# Patient Record
Sex: MALE | Race: BLACK OR AFRICAN AMERICAN | Employment: OTHER | URBAN - METROPOLITAN AREA
[De-identification: names, ages, dates, MRNs, and addresses within clinical notes are randomized per-mention and may not be internally consistent; named-entity substitution may affect disease eponyms.]

---

## 2017-08-11 ENCOUNTER — HOSPITAL ENCOUNTER (EMERGENCY)
Age: 67
Discharge: HOME | End: 2017-08-11
Attending: EMERGENCY MEDICINE
Payer: COMMERCIAL

## 2017-08-11 VITALS
WEIGHT: 280.43 LBS | DIASTOLIC BLOOD PRESSURE: 97 MMHG | TEMPERATURE: 98.6 F | RESPIRATION RATE: 18 BRPM | OXYGEN SATURATION: 99 % | SYSTOLIC BLOOD PRESSURE: 172 MMHG | BODY MASS INDEX: 38.03 KG/M2

## 2017-08-11 DIAGNOSIS — T78.3XXA ANGIOEDEMA, INITIAL ENCOUNTER: Primary | ICD-10-CM

## 2017-08-11 DIAGNOSIS — H60.502 ACUTE OTITIS EXTERNA OF LEFT EAR, UNSPECIFIED TYPE: ICD-10-CM

## 2017-08-11 PROCEDURE — 96374 THER/PROPH/DIAG INJ IV PUSH: CPT

## 2017-08-11 PROCEDURE — 96375 TX/PRO/DX INJ NEW DRUG ADDON: CPT

## 2017-08-11 PROCEDURE — 74011000250 HC RX REV CODE- 250: Performed by: EMERGENCY MEDICINE

## 2017-08-11 PROCEDURE — 99284 EMERGENCY DEPT VISIT MOD MDM: CPT

## 2017-08-11 PROCEDURE — 74011250636 HC RX REV CODE- 250/636: Performed by: EMERGENCY MEDICINE

## 2017-08-11 RX ORDER — CIPROFLOXACIN AND DEXAMETHASONE 3; 1 MG/ML; MG/ML
4 SUSPENSION/ DROPS AURICULAR (OTIC) 2 TIMES DAILY
Qty: 7.5 ML | Refills: 0 | Status: SHIPPED | OUTPATIENT
Start: 2017-08-11 | End: 2017-08-18

## 2017-08-11 RX ORDER — HYDROCHLOROTHIAZIDE 12.5 MG/1
12.5 TABLET ORAL DAILY
Qty: 30 TAB | Refills: 0 | Status: SHIPPED | OUTPATIENT
Start: 2017-08-11 | End: 2017-09-10

## 2017-08-11 RX ORDER — DIPHENHYDRAMINE HYDROCHLORIDE 50 MG/ML
25 INJECTION, SOLUTION INTRAMUSCULAR; INTRAVENOUS
Status: COMPLETED | OUTPATIENT
Start: 2017-08-11 | End: 2017-08-11

## 2017-08-11 RX ORDER — DEXAMETHASONE SODIUM PHOSPHATE 4 MG/ML
10 INJECTION, SOLUTION INTRA-ARTICULAR; INTRALESIONAL; INTRAMUSCULAR; INTRAVENOUS; SOFT TISSUE
Status: COMPLETED | OUTPATIENT
Start: 2017-08-11 | End: 2017-08-11

## 2017-08-11 RX ADMIN — FAMOTIDINE 20 MG: 10 INJECTION, SOLUTION INTRAVENOUS at 09:49

## 2017-08-11 RX ADMIN — DIPHENHYDRAMINE HYDROCHLORIDE 25 MG: 50 INJECTION, SOLUTION INTRAMUSCULAR; INTRAVENOUS at 09:50

## 2017-08-11 RX ADMIN — DEXAMETHASONE SODIUM PHOSPHATE 10 MG: 4 INJECTION, SOLUTION INTRAMUSCULAR; INTRAVENOUS at 09:50

## 2017-08-11 NOTE — ED NOTES
ermd in to see, piv established, pt here today for angioedema to the lips especially on the right side.

## 2017-08-11 NOTE — ED PROVIDER NOTES
HPI Comments: 62 yo M with Hx of HTN on Hyzaar for 2-3 years presenting for evaluation of facial swelling x waking at ~0600. Pt reports swelling was initially present on the left lip and face, then subsided and began to have swelling on the right. Wife notes mildly slurred speech, but no hoarseness. Pt also notes tongue swelling without any difficulty breathing or trouble swallowing. Pt took benadryl PTA without significant change. He denies any Hx of similar symptoms. Pt also notes left ear pain and discharge for the past few days. Denies F/C or headache. The history is provided by the patient. Past Medical History:   Diagnosis Date    Arrhythmia     Arthritis     HTN (hypertension)     Hypercholesterolemia     Low serum testosterone level     Sleep apnea        Past Surgical History:   Procedure Laterality Date    HX APPENDECTOMY      HX GI      COLONOSCOPY    HX HEENT      LASIK    HX HERNIA REPAIR      [facility]    HX ORTHOPAEDIC  2012    [place]patient, [place].(Dr. Javier Sevilla) ROTATOR CUFF    HX TONSILLECTOMY           Family History:   Problem Relation Age of Onset    Heart Disease Mother     Hypertension Mother     Heart Disease Father       \"Heart failure\" age 76    Hypertension Father     Bleeding Prob Sister      AFTER CHILDBIRTH-BLEEDING    Liver Disease Brother       cirrhosis (ETOH)    Alcohol abuse Brother     Heart Disease Sister      HEART FAILURE    Arthritis-osteo Sister        Social History     Social History    Marital status:      Spouse name: N/A    Number of children: N/A    Years of education: N/A     Occupational History    Not on file.      Social History Main Topics    Smoking status: Light Tobacco Smoker    Smokeless tobacco: Never Used      Comment: CIGARS    Alcohol use Yes      Comment: Rare ETOH    Drug use: Not on file    Sexual activity: Not on file     Other Topics Concern    Not on file     Social History Narrative ALLERGIES: Ace inhibitors and Shrimp    Review of Systems   Constitutional: Negative for chills, diaphoresis and fever. HENT: Positive for ear discharge, ear pain and facial swelling. Negative for congestion, rhinorrhea, sore throat and trouble swallowing. Eyes: Negative for photophobia, redness and visual disturbance. Respiratory: Negative for apnea, cough, shortness of breath, wheezing and stridor. Cardiovascular: Negative for chest pain, palpitations and leg swelling. Gastrointestinal: Negative for abdominal pain, diarrhea, nausea and vomiting. Endocrine: Negative for polyuria. Genitourinary: Negative for dysuria, frequency and hematuria. Musculoskeletal: Negative for arthralgias, back pain and myalgias. Skin: Negative for color change, pallor, rash and wound. Neurological: Negative for seizures, syncope and headaches. Psychiatric/Behavioral: Negative for agitation and confusion. The patient is not nervous/anxious. All other systems reviewed and are negative. Vitals:    08/11/17 0928 08/11/17 0929 08/11/17 0930 08/11/17 1000   BP: (!) 188/105  (!) 188/105 (!) 176/102   Resp:   18    Temp:   98.6 °F (37 °C)    SpO2:  100% 100% 100%   Weight:   127.2 kg (280 lb 6.8 oz)             Physical Exam   Constitutional: He appears well-developed and well-nourished. No distress. Obese middle-aged male   HENT:   Head: Normocephalic and atraumatic. Right upper and lower lip edema, tongue edema, no obvious tonsillar or uvula edema, handling secretions    TMs gray, without bulging or erythema. Left TM incompletely visualized due to exudate    Eyes: Conjunctivae are normal. No scleral icterus. Neck: Neck supple. No tracheal deviation present. Cardiovascular: Normal rate, regular rhythm and normal heart sounds. Exam reveals no gallop and no friction rub. No murmur heard. Pulmonary/Chest: Effort normal and breath sounds normal. No stridor. No respiratory distress.  He has no wheezes. He has no rales. Abdominal: Soft. He exhibits no distension and no mass. There is no tenderness. There is no rebound and no guarding. Musculoskeletal: He exhibits no edema or deformity. Neurological: He is alert. Skin: Skin is warm and dry. No rash noted. He is not diaphoretic. No erythema. No pallor. Psychiatric: He has a normal mood and affect. His behavior is normal.   Nursing note and vitals reviewed. MDM  Number of Diagnoses or Management Options  Diagnosis management comments: DDx: Angioedema, otitis externa    A/P: 60 yo M with Hx of HTN on ARB-HCTZ combination presenting with angioedema x ~0600. Will give IV pepcid, benadryl and steroids and monitor for 2 hours. Pt also with apparent otitis externa, will Rx ciprodex. ED Course       Procedures    10:37 AM  Pt re-evaluated and is resting comfortably. No interval change in swelling or new difficulty breathing. Deneen Dunn MD    DISCHARGE NOTE  11:52 AM  The patient has been re-evaluated and is ready for discharge. Still with no interval change in swelling. Advised of need to stop Hyzaar and will Rx HCTZ portion. Also advised to continue OTC benadryl and H2 blocker for the next 2-3 days and close PCP F/U. for management of BP Reviewed available results with patient. Counseled pt on diagnosis and care plan. Pt has expressed understanding, and all questions have been answered. Pt agrees with plan and agrees to F/U as recommended, or return to the ED if their sxs worsen. Discharge instructions have been provided and explained to the pt, along with reasons to return to the ED. Deneen Dunn MD    LABORATORY TESTS:  No results found for this or any previous visit (from the past 12 hour(s)).     IMAGING RESULTS:  No orders to display       MEDICATIONS GIVEN:  Medications   famotidine (PF) (PEPCID) 20 mg in sodium chloride 0.9 % 10 mL injection (20 mg IntraVENous Given 8/11/17 0949)   dexamethasone (DECADRON) 4 mg/mL injection 10 mg (10 mg IntraVENous Given 1750)   diphenhydrAMINE (BENADRYL) injection 25 mg (25 mg IntraVENous Given 17)       IMPRESSION:  1. Angioedema, initial encounter    2. Acute otitis externa of left ear, unspecified type        PLAN:  1. Discharge Medication List as of 2017 11:59 AM      START taking these medications    Details   hydroCHLOROthiazide (HYDRODIURIL) 12.5 mg tablet Take 1 Tab by mouth daily for 30 days. , Normal, Disp-30 Tab, R-0      ciprofloxacin-dexamethasone (CIPRODEX) 0.3-0.1 % otic suspension Administer 4 Drops in left ear two (2) times a day for 7 days. , Normal, Disp-7.5 mL, R-0         CONTINUE these medications which have NOT CHANGED    Details   CIALIS 20 mg tablet TAKE 1/2 (ONE HALF) TABLET BY MOUTH EVERY DAY AS NEEDED FOR ERECTILE DYSFUNCTION, Normal, Disp-15 Tab, R-4      metoprolol (LOPRESSOR) 25 mg tablet Take  by mouth two (2) times a day., Historical Med      aspirin delayed-release 81 mg tablet Take  by mouth daily. , Historical Med         STOP taking these medications       losartan-hydrochlorothiazide (HYZAAR) 50-12.5 mg per tablet Comments:   Reason for Stoppin.   Follow-up Information     Follow up With Details Comments 64 Milagros Torres DO In 2 days  [facility] [address] [place] [address]  [phone]      [facility]  As needed, If symptoms worsen [address] [place] [address]  [phone]        Return to ED if worse

## 2017-08-11 NOTE — DISCHARGE INSTRUCTIONS
Angioedema: Care Instructions  Your Care Instructions  Angioedema is an allergic reaction. It causes swelling and welts in the deep layers of the skin. Angioedema can sometimes occur along with hives. Hives are an allergic reaction in the outer layers of the skin. Angioedema can range from mild to severe. Painful welts can develop on the face. Angioedema can also occur on other parts of the body. In severe cases, the inside of the throat can swell and make it hard to breathe. Many things can cause this condition, including foods, insect bites, and medicines (such as aspirin and some blood pressure medicines). It also can run in families. Sometimes you may know what caused the reaction, but other times you may not know. Follow-up care is a key part of your treatment and safety. Be sure to make and go to all appointments, and call your doctor if you are having problems. It's also a good idea to know your test results and keep a list of the medicines you take. How can you care for yourself at home? · Take your medicines exactly as prescribed. Call your doctor if you think you are having a problem with your medicine. You will get more details on the specific medicines your doctor prescribes. Some medicines used to treat angioedema can make you too sleepy to drive safely. Do not drive if you take medicine that may make you sleepy. · Avoid foods or medicine that may have triggered the swelling. · For comfort:  ¨ Try taking a cool bath. Or place a cool, wet towel on the swollen area. ¨ Avoid hot baths and showers. ¨ Wear loose clothing. · Your doctor may prescribe a shot of epinephrine to carry with you in case you have a severe reaction. Learn how to give yourself the shot and keep it with you at all times. Make sure it has not . When should you call for help? Give an epinephrine shot if:  · You think you are having a severe allergic reaction.   After giving an epinephrine shot call 911, even if you feel better. Call 911 if:  · You have symptoms of a severe allergic reaction. These may include:  ¨ Sudden raised, red areas (hives) all over your body. ¨ Swelling of the throat, mouth, lips, or tongue. ¨ Trouble breathing. ¨ Passing out (losing consciousness). Or you may feel very lightheaded or suddenly feel weak, confused, or restless. · You have been given an epinephrine shot, even if you feel better. Call your doctor now or seek immediate medical care if:  · You have symptoms of an allergic reaction, such as:  ¨ A rash or hives (raised, red areas on the skin). ¨ Itching. ¨ Swelling. ¨ Belly pain, nausea, or vomiting. Watch closely for changes in your health, and be sure to contact your doctor if:  · You do not get better as expected. Where can you learn more? Go to http://sarmadEasy Tempoalessandra.info/. Enter Z894 in the search box to learn more about \"Angioedema: Care Instructions. \"  Current as of: April 3, 2017  Content Version: 11.3  © 2570-6610 [employer]. Care instructions adapted under license by [employer] (which disclaims liability or warranty for this information). If you have questions about a medical condition or this instruction, always ask your healthcare professional. [address] any warranty or liability for your use of this information. Swimmer's Ear: Care Instructions  Your Care Instructions    Swimmer's ear (otitis externa) is inflammation or infection of the ear canal. This is the passage that leads from the outer ear to the eardrum. Any water, sand, or other debris that gets into the ear canal and stays there can cause swimmer's ear. Putting cotton swabs or other items in the ear to clean it can also cause this problem. Swimmer's ear can be very painful. But you can treat the pain and infection with medicines. You should feel better in a few days. Follow-up care is a key part of your treatment and safety.  Be sure to make and go to all appointments, and call your doctor if you are having problems. It's also a good idea to know your test results and keep a list of the medicines you take. How can you care for yourself at home? Cleaning and care  · Use antibiotic drops as your doctor directs. · Do not insert ear drops (other than the antibiotic ear drops) or anything else into the ear unless your doctor has told you to. · Avoid getting water in the ear until the problem clears up. Use cotton lightly coated with petroleum jelly as an earplug. Do not use plastic earplugs. · Use a hair dryer set on low to carefully dry the ear after you shower. · To ease ear pain, hold a warm washcloth against your ear. · Take pain medicines exactly as directed. ¨ If the doctor gave you a prescription medicine for pain, take it as prescribed. ¨ If you are not taking a prescription pain medicine, ask your doctor if you can take an over-the-counter medicine. Inserting ear drops  · Warm the drops to body temperature by rolling the container in your hands. Or you can place it in a cup of warm water for a few minutes. · Lie down, with your ear facing up. · Place drops inside the ear. Follow your doctor's instructions (or the directions on the label) for how many drops to use. Gently wiggle the outer ear or pull the ear up and back to help the drops get into the ear. · It's important to keep the liquid in the ear canal for 3 to 5 minutes. When should you call for help? Call your doctor now or seek immediate medical care if:  · You have a new or higher fever. · You have new or worse pain, swelling, warmth, or redness around or behind your ear. · You have new or increasing pus or blood draining from your ear. Watch closely for changes in your health, and be sure to contact your doctor if:  · You are not getting better after 2 days (48 hours). Where can you learn more? Go to http://sarmad-alessandra.info/.   Enter P088 in the search box to learn more about \"Swimmer's Ear: Care Instructions. \"  Current as of: July 29, 2016  Content Version: 11.3  © 1897-9969 [employer]. Care instructions adapted under license by [employer] (which disclaims liability or warranty for this information). If you have questions about a medical condition or this instruction, always ask your healthcare professional. [place] [address] any warranty or liability for your use of this information.

## 2018-02-07 RX ORDER — LOSARTAN POTASSIUM 100 MG/1
100 TABLET ORAL DAILY
COMMUNITY
End: 2019-01-16

## 2018-02-07 RX ORDER — HYDROCHLOROTHIAZIDE 25 MG/1
25 TABLET ORAL DAILY
COMMUNITY

## 2018-02-07 RX ORDER — SILDENAFIL CITRATE 20 MG/1
20 TABLET ORAL AS NEEDED
COMMUNITY
End: 2019-07-10

## 2018-02-07 RX ORDER — ROSUVASTATIN CALCIUM 10 MG/1
10 TABLET, COATED ORAL
Status: ON HOLD | COMMUNITY
End: 2018-02-09

## 2018-02-09 ENCOUNTER — ANESTHESIA (OUTPATIENT)
Dept: ENDOSCOPY | Age: 68
End: 2018-02-09
Payer: MEDICARE

## 2018-02-09 ENCOUNTER — ANESTHESIA EVENT (OUTPATIENT)
Dept: ENDOSCOPY | Age: 68
End: 2018-02-09
Payer: MEDICARE

## 2018-02-09 ENCOUNTER — HOSPITAL ENCOUNTER (OUTPATIENT)
Age: 68
Setting detail: OUTPATIENT SURGERY
Discharge: HOME | End: 2018-02-09
Attending: INTERNAL MEDICINE | Admitting: INTERNAL MEDICINE
Payer: MEDICARE

## 2018-02-09 VITALS
SYSTOLIC BLOOD PRESSURE: 152 MMHG | RESPIRATION RATE: 18 BRPM | HEART RATE: 57 BPM | WEIGHT: 273 LBS | DIASTOLIC BLOOD PRESSURE: 69 MMHG | OXYGEN SATURATION: 100 % | HEIGHT: 71 IN | TEMPERATURE: 98.1 F | BODY MASS INDEX: 38.22 KG/M2

## 2018-02-09 PROCEDURE — 76060000031 HC ANESTHESIA FIRST 0.5 HR: Performed by: INTERNAL MEDICINE

## 2018-02-09 PROCEDURE — 74011250636 HC RX REV CODE- 250/636: Performed by: INTERNAL MEDICINE

## 2018-02-09 PROCEDURE — 74011000250 HC RX REV CODE- 250

## 2018-02-09 PROCEDURE — 74011250636 HC RX REV CODE- 250/636

## 2018-02-09 PROCEDURE — 76040000019: Performed by: INTERNAL MEDICINE

## 2018-02-09 PROCEDURE — 74011250637 HC RX REV CODE- 250/637: Performed by: ANESTHESIOLOGY

## 2018-02-09 RX ORDER — SODIUM CHLORIDE 0.9 % (FLUSH) 0.9 %
5-10 SYRINGE (ML) INJECTION EVERY 8 HOURS
Status: DISCONTINUED | OUTPATIENT
Start: 2018-02-09 | End: 2018-02-09 | Stop reason: HOSPADM

## 2018-02-09 RX ORDER — LIDOCAINE HYDROCHLORIDE 20 MG/ML
INJECTION, SOLUTION EPIDURAL; INFILTRATION; INTRACAUDAL; PERINEURAL AS NEEDED
Status: DISCONTINUED | OUTPATIENT
Start: 2018-02-09 | End: 2018-02-09 | Stop reason: HOSPADM

## 2018-02-09 RX ORDER — DEXTROMETHORPHAN/PSEUDOEPHED 2.5-7.5/.8
1.2 DROPS ORAL
Status: DISCONTINUED | OUTPATIENT
Start: 2018-02-09 | End: 2018-02-09 | Stop reason: HOSPADM

## 2018-02-09 RX ORDER — EPINEPHRINE 0.1 MG/ML
1 INJECTION INTRACARDIAC; INTRAVENOUS
Status: DISCONTINUED | OUTPATIENT
Start: 2018-02-09 | End: 2018-02-09 | Stop reason: HOSPADM

## 2018-02-09 RX ORDER — SODIUM CHLORIDE 9 MG/ML
75 INJECTION, SOLUTION INTRAVENOUS CONTINUOUS
Status: DISCONTINUED | OUTPATIENT
Start: 2018-02-09 | End: 2018-02-09 | Stop reason: HOSPADM

## 2018-02-09 RX ORDER — SODIUM CHLORIDE 0.9 % (FLUSH) 0.9 %
5-10 SYRINGE (ML) INJECTION AS NEEDED
Status: DISCONTINUED | OUTPATIENT
Start: 2018-02-09 | End: 2018-02-09 | Stop reason: HOSPADM

## 2018-02-09 RX ORDER — FLUMAZENIL 0.1 MG/ML
0.2 INJECTION INTRAVENOUS
Status: DISCONTINUED | OUTPATIENT
Start: 2018-02-09 | End: 2018-02-09 | Stop reason: HOSPADM

## 2018-02-09 RX ORDER — ATROPINE SULFATE 0.1 MG/ML
0.5 INJECTION INTRAVENOUS
Status: DISCONTINUED | OUTPATIENT
Start: 2018-02-09 | End: 2018-02-09 | Stop reason: HOSPADM

## 2018-02-09 RX ORDER — NALOXONE HYDROCHLORIDE 0.4 MG/ML
0.4 INJECTION, SOLUTION INTRAMUSCULAR; INTRAVENOUS; SUBCUTANEOUS
Status: DISCONTINUED | OUTPATIENT
Start: 2018-02-09 | End: 2018-02-09 | Stop reason: HOSPADM

## 2018-02-09 RX ORDER — PROPOFOL 10 MG/ML
INJECTION, EMULSION INTRAVENOUS AS NEEDED
Status: DISCONTINUED | OUTPATIENT
Start: 2018-02-09 | End: 2018-02-09 | Stop reason: HOSPADM

## 2018-02-09 RX ADMIN — PROPOFOL 150 MG: 10 INJECTION, EMULSION INTRAVENOUS at 08:36

## 2018-02-09 RX ADMIN — PROPOFOL 70 MG: 10 INJECTION, EMULSION INTRAVENOUS at 08:27

## 2018-02-09 RX ADMIN — SODIUM CHLORIDE: 900 INJECTION, SOLUTION INTRAVENOUS at 08:17

## 2018-02-09 RX ADMIN — SIMETHICONE 80 MG: 20 SUSPENSION/ DROPS ORAL at 08:33

## 2018-02-09 RX ADMIN — LIDOCAINE HYDROCHLORIDE 50 MG: 20 INJECTION, SOLUTION EPIDURAL; INFILTRATION; INTRACAUDAL; PERINEURAL at 08:27

## 2018-02-09 NOTE — PROCEDURES
NAME:  General Pompey   :   1950   MRN:   [id number]     Date/Time:  2018 8:38 AM    Colonoscopy Operative Report    Procedure Type:   Colonoscopy --screening     Indications:     Screening colonoscopy  Pre-operative Diagnosis: see indication above  Post-operative Diagnosis:  See findings below  :  Pastor Ashly MD  Referring Provider: --Kendra Espinoza,     Exam:  Airway: clear, no airway problems anticipated  Heart: RRR, without gallops or rubs  Lungs: clear bilaterally without wheezes, crackles, or rhonchi  Abdomen: soft, nontender, nondistended, bowel sounds present  Mental Status: awake, alert and oriented to person, place and time    Sedation:  MAC anesthesia Propofol  Procedure Details:  After informed consent was obtained with all risks and benefits of procedure explained and preoperative exam completed, the patient was taken to the endoscopy suite and placed in the left lateral decubitus position. Upon sequential sedation as per above, a digital rectal exam was performed demonstrating internal hemorrhoids. The Olympus videocolonoscope  was inserted in the rectum and carefully advanced to the cecum, which was identified by the ileocecal valve and appendiceal orifice. The quality of preparation was good. The colonoscope was slowly withdrawn with careful evaluation between folds. Retroflexion in the rectum was completed demonstrating internal hemorrhoids. Findings:   1. Normal colonoscopy through to the cecum  2. Small internal hemorrhoids seen on retroflexion. Specimen Removed:  None  Complications: None. EBL:  None. Impression:    1. Normal colonoscopy through to the cecum  2. Small internal hemorrhoids seen on retroflexion. Recommendations:   1. Repeat colonoscopy in 10 years for screening purposes. Discharge Disposition:  Home in the company of a  when able to ambulate.       James Venegas MD

## 2018-02-09 NOTE — ANESTHESIA POSTPROCEDURE EVALUATION
Post-Anesthesia Evaluation and Assessment    Patient: Benigno Palomares MRN: [id number]  SSN: [id number]    YOB: 1950  Age: 74 y.o. Sex: male       Cardiovascular Function/Vital Signs  Visit Vitals    /66    Pulse 60    Temp 36.8 °C (98.2 °F)    Resp 14    Ht 5' 11\" (1.803 m)    Wt 123.8 kg (273 lb)    SpO2 98%    BMI 38.08 kg/m2       Patient is status post general, total IV anesthesia anesthesia for Procedure(s):  COLONOSCOPY. Nausea/Vomiting: None    Postoperative hydration reviewed and adequate. Pain:  Pain Scale 1: Numeric (0 - 10) (02/09/18 0803)  Pain Intensity 1: 0 (02/09/18 0803)   Managed    Neurological Status: At baseline    Mental Status and Level of Consciousness: Arousable    Pulmonary Status:   O2 Device: CO2 nasal cannula (02/09/18 0839)   Adequate oxygenation and airway patent    Complications related to anesthesia: None    Post-anesthesia assessment completed.  No concerns    Signed By: Gemini Lara MD     February 9, 2018

## 2018-02-09 NOTE — PERIOP NOTES
Endoscope was pre-cleaned at the bedside immediately following procedure by [facility].

## 2018-02-09 NOTE — IP AVS SNAPSHOT
[address] 
[phone] Patient: Angela Causey MRN: [id number] TULIO:[id number] About your hospitalization You were admitted on:  February 9, 2018 You last received care in the:  [facility] ENDOSCOPY You were discharged on:  February 9, 2018 Why you were hospitalized Your primary diagnosis was:  Not on File Follow-up Information Follow up With Details Comments Contact Info Natasha Gonzalesn, [address] [facility] [address] [place] [address] 
[phone] Discharge Orders None A check mami indicates which time of day the medication should be taken. My Medications CONTINUE taking these medications Instructions Each Dose to Equal  
 Morning Noon Evening Bedtime  
 aspirin delayed-release 81 mg tablet Your last dose was: Your next dose is: Take  by mouth daily. hydroCHLOROthiazide 25 mg tablet Commonly known as:  HYDRODIURIL Your last dose was: Your next dose is: Take 25 mg by mouth daily. 25 mg  
    
   
   
   
  
 losartan 100 mg tablet Commonly known as:  COZAAR Your last dose was: Your next dose is: Take 100 mg by mouth daily. 100 mg  
    
   
   
   
  
 metoprolol tartrate 25 mg tablet Commonly known as:  LOPRESSOR Your last dose was: Your next dose is: Take  by mouth two (2) times a day. sildenafil (antihypertensive) 20 mg tablet Commonly known as:  REVATIO Your last dose was: Your next dose is: Take 20 mg by mouth two (2) times a day. 20 mg Discharge Instructions General Pompey [id number] 
1950 COLON DISCHARGE INSTRUCTIONS Discomfort: 
Redness at IV site- apply warm compress to area; if redness or soreness persist- contact your physician There may be a slight amount of blood passed from the rectum Gaseous discomfort- walking, belching will help relieve any discomfort You may not operate a vehicle for 12 hours You may not engage in an occupation involving machinery or appliances for rest of today You may not drink alcoholic beverages for at least 12 hours Avoid making any critical decisions for at least 24 hour DIET: 
 Regular diet.  however -  remember your colon is empty and a heavy meal will produce gas. Avoid these foods:  vegetables, fried / greasy foods, carbonated drinks for today MEDICATION: 
Per Medication reconciliation ACTIVITY: 
You may not resume your normal daily activities until tomorrow AM; it is recommended that you spend the remainder of the day resting -  avoid any strenuous activity. CALL M.D. ANY SIGN OF: Increasing pain, nausea, vomiting Abdominal distension (swelling) New increased bleeding (oral or rectal) Fever (chills) IMPRESSION: 
Impression: 1. Normal colonoscopy through to the cecum 2. Small internal hemorrhoids seen on retroflexion. Recommendations: 1. Repeat colonoscopy in 10 years for screening purposes. Follow-up Instructions: 
Telephone # [phone] Lois Shone, MD 
Ac[employer]ure Activation Thank you for requesting access to Acrisure. Please follow the instructions below to securely access and download your online medical record. Acrisure allows you to send messages to your doctor, view your test results, renew your prescriptions, schedule appointments, and more. How Do I Sign Up? 1. In your internet browser, go to [email or web address] 
2. Click on the First Time User? Click Here link in the Sign In box. You will be redirect to the New Member Sign Up page. 3. Enter your Acrisure Access Code exactly as it appears below. You will not need to use this code after youve completed the sign-up process.  If you do not sign up before the expiration date, you must request a new code. Stockpile Access Code: [email or web address] Expires: 5/10/2018  7:09 AM (This is the date your Stockpile access code will ) 4. Enter the last four digits of your Social Security Number (xxxx) and Date of Birth (mm/dd/yyyy) as indicated and click Submit. You will be taken to the next sign-up page. 5. Create a Stockpile ID. This will be your Stockpile login ID and cannot be changed, so think of one that is secure and easy to remember. 6. Create a Stockpile password. You can change your password at any time. 7. Enter your Password Reset Question and Answer. This can be used at a later time if you forget your password. 8. Enter your e-mail address. You will receive e-mail notification when new information is available in [address] 9. Click Sign Up. You can now view and download portions of your medical record. 10. Click the Download Summary menu link to download a portable copy of your medical information. Additional Information If you have questions, please visit the Frequently Asked Questions section of the Stockpile website at [email or web address] Remember, Stockpile is NOT to be used for urgent needs. For medical emergencies, dial 911. Introducing [facility]! Melissa Carr introduces Stockpile patient portal. Now you can access parts of your medical record, email your doctor's office, and request medication refills online. 1. In your internet browser, go to [email or web address] 2. Click on the First Time User? Click Here link in the Sign In box. You will see the New Member Sign Up page. 3. Enter your Stockpile Access Code exactly as it appears below. You will not need to use this code after youve completed the sign-up process. If you do not sign up before the expiration date, you must request a new code. · Stockpile Access Code: [email or web address] Expires: 5/10/2018  7:09 AM 
 
4. Enter the last four digits of your Social Security Number (xxxx) and Date of Birth (mm/dd/yyyy) as indicated and click Submit. You will be taken to the next sign-up page. 5. Create a HealthPockett ID. This will be your [employer] login ID and cannot be changed, so think of one that is secure and easy to remember. 6. Create a [employer] password. You can change your password at any time. 7. Enter your Password Reset Question and Answer. This can be used at a later time if you forget your password. 8. Enter your e-mail address. You will receive e-mail notification when new information is available in [address] 9. Click Sign Up. You can now view and download portions of your medical record. 10. Click the Download Summary menu link to download a portable copy of your medical information. If you have questions, please visit the Frequently Asked Questions section of the [employer] website. Remember, [employer] is NOT to be used for urgent needs. For medical emergencies, dial 911. Now available from your iPhone and Android! Providers Seen During Your Hospitalization Provider Specialty Primary office phone Alicia Isabel MD Gastroenterology [phone] Your Primary Care Physician (PCP) Primary Care Physician Office Phone Office Fax Evelin Gonzales [phone] You are allergic to the following Allergen Reactions Ace Inhibitors Angioedema Lip swelling with losartan Shrimp Hives Recent Documentation Height Weight BMI Smoking Status 1.803 m 123.8 kg 38.08 kg/m2 Former Smoker Emergency Contacts Name Discharge Info Relation Home Work Mobile PompeyJenni DISCHARGE CAREGIVER [3] Spouse [3]   [phone] Patient Belongings The following personal items are in your possession at time of discharge: 
  Dental Appliances: None  Visual Aid: None Please provide this summary of care documentation to your next provider. Signatures-by signing, you are acknowledging that this After Visit Summary has been reviewed with you and you have received a copy. Patient Signature:  ____________________________________________________________ Date:  ____________________________________________________________  
  
Amanda Keas Provider Signature:  ____________________________________________________________ Date:  ____________________________________________________________

## 2018-02-09 NOTE — H&P
Gastroenterology Outpatient History and Physical    Patient: General Pompey    Physician: Shirley Guo MD    Chief Complaint: CRC screening  History of Present Illness: No GI complaints    History:  Past Medical History:   Diagnosis Date    Arrhythmia     enlarged heart    Arthritis     HTN (hypertension)     Hypercholesterolemia     Low serum testosterone level     Sleep apnea     wears CPAP at night      Past Surgical History:   Procedure Laterality Date    HX APPENDECTOMY      HX GI      COLONOSCOPY    HX HEENT      LASIK    HX HERNIA REPAIR  1969    [facility]    HX ORTHOPAEDIC Right 2012    [place], [place].(Dr. Alvino Lewis) ROTATOR CUFF    HX TONSILLECTOMY        Social History     Social History    Marital status:      Spouse name: N/A    Number of children: N/A    Years of education: N/A     Social History Main Topics    Smoking status: Former Smoker     Packs/day: 0.50     Quit date:     Smokeless tobacco: Never Used      Comment: CIGARS    Alcohol use Yes      Comment: Rare ETOH    Drug use: No    Sexual activity: Not Asked     Other Topics Concern    None     Social History Narrative      Family History   Problem Relation Age of Onset    Heart Disease Mother     Hypertension Mother     Heart Disease Father       \"Heart failure\" age 76    Hypertension Father     Bleeding Prob Sister      AFTER CHILDBIRTH-BLEEDING    Liver Disease Brother       cirrhosis (ETOH)    Alcohol abuse Brother     Heart Disease Sister      HEART FAILURE    Arthritis-osteo Sister       Patient Active Problem List   Diagnosis Code    HTN (hypertension) I10    Hypercholesterolemia E78.00    Encounter for long-term (current) use of other medications Z79.899    Low serum testosterone level E29.1       Allergies:    Allergies   Allergen Reactions    Ace Inhibitors Angioedema     Lip swelling with losartan    Shrimp Hives     Medications:   Prior to Admission medications    Medication Sig Start Date End Date Taking? Authorizing Provider   sildenafil, antihypertensive, (REVATIO) 20 mg tablet Take 20 mg by mouth two (2) times a day. Yes Historical Provider   losartan (COZAAR) 100 mg tablet Take 100 mg by mouth daily. Yes Historical Provider   hydroCHLOROthiazide (HYDRODIURIL) 25 mg tablet Take 25 mg by mouth daily. Yes Historical Provider   metoprolol (LOPRESSOR) 25 mg tablet Take  by mouth two (2) times a day. Yes Historical Provider   aspirin delayed-release 81 mg tablet Take  by mouth daily. Yes Historical Provider     Physical Exam:   Vital Signs: Blood pressure 144/82, pulse (!) 57, temperature 98 °F (36.7 °C), resp. rate 19, height 5' 11\" (1.803 m), weight 123.8 kg (273 lb), SpO2 99 %.   General: well developed, well nourished   HEENT: unremarkable   Heart: regular rhythm no mumur    Lungs: clear   Abdominal:  benign   Neurological: unremarkable   Extremities: no edema     Findings/Diagnosis: CRC screening  Plan of Care/Planned Procedure: Colonoscopy with conscious/deep sedation    Signed:  Meghan Arevalo MD 2/9/2018

## 2018-02-09 NOTE — PERIOP NOTES
Anesthesia reports 220mg Propofol, 50mg Lidocaine and 300mL NS given during procedure. Received report from anesthesia staff on vital signs and status of patient.

## 2018-02-09 NOTE — ANESTHESIA PREPROCEDURE EVALUATION
Anesthetic History   No history of anesthetic complications            Review of Systems / Medical History  Patient summary reviewed, nursing notes reviewed and pertinent labs reviewed    Pulmonary        Sleep apnea: CPAP        Comments: Former smoker   Neuro/Psych   Within defined limits           Cardiovascular    Hypertension: well controlled        Dysrhythmias   Hyperlipidemia    Exercise tolerance: >4 METS  Comments: Had his beta blocker at 11 pm last night.    GI/Hepatic/Renal  Within defined limits              Endo/Other        Arthritis     Other Findings              Physical Exam    Airway  Mallampati: II  TM Distance: > 6 cm  Neck ROM: normal range of motion   Mouth opening: Normal     Cardiovascular  Regular rate and rhythm,  S1 and S2 normal,  no murmur, click, rub, or gallop             Dental    Dentition: Implants and Caps/crowns     Pulmonary  Breath sounds clear to auscultation               Abdominal  GI exam deferred       Other Findings            Anesthetic Plan    ASA: 2  Anesthesia type: general and total IV anesthesia          Induction: Intravenous  Anesthetic plan and risks discussed with: Patient

## 2018-02-09 NOTE — DISCHARGE INSTRUCTIONS
General Pompey  [id number]  1950    COLON DISCHARGE INSTRUCTIONS  Discomfort:  Redness at IV site- apply warm compress to area; if redness or soreness persist- contact your physician  There may be a slight amount of blood passed from the rectum  Gaseous discomfort- walking, belching will help relieve any discomfort  You may not operate a vehicle for 12 hours  You may not engage in an occupation involving machinery or appliances for rest of today  You may not drink alcoholic beverages for at least 12 hours  Avoid making any critical decisions for at least 24 hour  DIET:   Regular diet. - however -  remember your colon is empty and a heavy meal will produce gas. Avoid these foods:  vegetables, fried / greasy foods, carbonated drinks for today  MEDICATION:  Per Medication reconciliation       ACTIVITY:  You may not resume your normal daily activities until tomorrow AM; it is recommended that you spend the remainder of the day resting -  avoid any strenuous activity. CALL M.D. ANY SIGN OF:   Increasing pain, nausea, vomiting  Abdominal distension (swelling)  New increased bleeding (oral or rectal)  Fever (chills)        IMPRESSION:  Impression:    1. Normal colonoscopy through to the cecum  2. Small internal hemorrhoids seen on retroflexion. Recommendations:   1. Repeat colonoscopy in 10 years for screening purposes. Follow-up Instructions:  Telephone # Abbi Jennings MD  [employer] Activation    Thank you for requesting access to [address] Please follow the instructions below to securely access and download your online medical record. [employer]Science allows you to send messages to your doctor, view your test results, renew your prescriptions, schedule appointments, and more. How Do I Sign Up? 1. In your internet browser, go to [email or web address]  2. Click on the First Time User? Click Here link in the Sign In box. You will be redirect to the New Member Sign Up page.   3. Enter your iZettlet Access Code exactly as it appears below. You will not need to use this code after youve completed the sign-up process. If you do not sign up before the expiration date, you must request a new code. [employer] Access Code: [email or web address]  Expires: 5/10/2018  7:09 AM (This is the date your [employer] access code will )    4. Enter the last four digits of your Social Security Number (xxxx) and Date of Birth (mm/dd/yyyy) as indicated and click Submit. You will be taken to the next sign-up page. 5. Create a Social Recruitingt ID. This will be your [employer] login ID and cannot be changed, so think of one that is secure and easy to remember. 6. Create a [employer] password. You can change your password at any time. 7. Enter your Password Reset Question and Answer. This can be used at a later time if you forget your password. 8. Enter your e-mail address. You will receive e-mail notification when new information is available in [address] 9. Click Sign Up. You can now view and download portions of your medical record. 10. Click the Download Summary menu link to download a portable copy of your medical information. Additional Information    If you have questions, please visit the Frequently Asked Questions section of the [employer] website at [email or web address] Remember, [employer] is NOT to be used for urgent needs. For medical emergencies, dial 911.

## 2019-01-16 RX ORDER — EZETIMIBE 10 MG/1
10 TABLET ORAL DAILY
COMMUNITY

## 2019-01-16 RX ORDER — LOSARTAN POTASSIUM 100 MG/1
100 TABLET ORAL DAILY
COMMUNITY

## 2019-01-16 RX ORDER — AMLODIPINE BESYLATE 2.5 MG/1
2.5 TABLET ORAL DAILY
COMMUNITY

## 2019-01-17 ENCOUNTER — HOSPITAL ENCOUNTER (OUTPATIENT)
Age: 69
Setting detail: OUTPATIENT SURGERY
Discharge: HOME | End: 2019-01-17
Attending: INTERNAL MEDICINE | Admitting: INTERNAL MEDICINE
Payer: MEDICARE

## 2019-01-17 ENCOUNTER — ANESTHESIA (OUTPATIENT)
Dept: ENDOSCOPY | Age: 69
End: 2019-01-17
Payer: MEDICARE

## 2019-01-17 ENCOUNTER — ANESTHESIA EVENT (OUTPATIENT)
Dept: ENDOSCOPY | Age: 69
End: 2019-01-17
Payer: MEDICARE

## 2019-01-17 VITALS
SYSTOLIC BLOOD PRESSURE: 127 MMHG | TEMPERATURE: 97.8 F | OXYGEN SATURATION: 100 % | RESPIRATION RATE: 16 BRPM | HEART RATE: 61 BPM | BODY MASS INDEX: 38.08 KG/M2 | HEIGHT: 71 IN | DIASTOLIC BLOOD PRESSURE: 71 MMHG | WEIGHT: 272 LBS

## 2019-01-17 PROCEDURE — 74011250636 HC RX REV CODE- 250/636

## 2019-01-17 PROCEDURE — 76040000019: Performed by: INTERNAL MEDICINE

## 2019-01-17 PROCEDURE — 76060000031 HC ANESTHESIA FIRST 0.5 HR: Performed by: INTERNAL MEDICINE

## 2019-01-17 PROCEDURE — 74011250636 HC RX REV CODE- 250/636: Performed by: INTERNAL MEDICINE

## 2019-01-17 RX ORDER — SODIUM CHLORIDE 0.9 % (FLUSH) 0.9 %
5-40 SYRINGE (ML) INJECTION EVERY 8 HOURS
Status: DISCONTINUED | OUTPATIENT
Start: 2019-01-17 | End: 2019-01-17 | Stop reason: HOSPADM

## 2019-01-17 RX ORDER — FLUMAZENIL 0.1 MG/ML
0.2 INJECTION INTRAVENOUS
Status: DISCONTINUED | OUTPATIENT
Start: 2019-01-17 | End: 2019-01-17 | Stop reason: HOSPADM

## 2019-01-17 RX ORDER — SODIUM CHLORIDE 9 MG/ML
75 INJECTION, SOLUTION INTRAVENOUS CONTINUOUS
Status: DISCONTINUED | OUTPATIENT
Start: 2019-01-17 | End: 2019-01-17 | Stop reason: HOSPADM

## 2019-01-17 RX ORDER — PHENYLEPHRINE HCL IN 0.9% NACL 0.4MG/10ML
SYRINGE (ML) INTRAVENOUS AS NEEDED
Status: DISCONTINUED | OUTPATIENT
Start: 2019-01-17 | End: 2019-01-17 | Stop reason: HOSPADM

## 2019-01-17 RX ORDER — EPINEPHRINE 0.1 MG/ML
1 INJECTION INTRACARDIAC; INTRAVENOUS
Status: DISCONTINUED | OUTPATIENT
Start: 2019-01-17 | End: 2019-01-17 | Stop reason: HOSPADM

## 2019-01-17 RX ORDER — ATROPINE SULFATE 0.1 MG/ML
0.5 INJECTION INTRAVENOUS
Status: DISCONTINUED | OUTPATIENT
Start: 2019-01-17 | End: 2019-01-17 | Stop reason: HOSPADM

## 2019-01-17 RX ORDER — SODIUM CHLORIDE 0.9 % (FLUSH) 0.9 %
5-40 SYRINGE (ML) INJECTION AS NEEDED
Status: DISCONTINUED | OUTPATIENT
Start: 2019-01-17 | End: 2019-01-17 | Stop reason: HOSPADM

## 2019-01-17 RX ORDER — PROPOFOL 10 MG/ML
INJECTION, EMULSION INTRAVENOUS AS NEEDED
Status: DISCONTINUED | OUTPATIENT
Start: 2019-01-17 | End: 2019-01-17 | Stop reason: HOSPADM

## 2019-01-17 RX ORDER — NALOXONE HYDROCHLORIDE 0.4 MG/ML
0.4 INJECTION, SOLUTION INTRAMUSCULAR; INTRAVENOUS; SUBCUTANEOUS
Status: DISCONTINUED | OUTPATIENT
Start: 2019-01-17 | End: 2019-01-17 | Stop reason: HOSPADM

## 2019-01-17 RX ORDER — LIDOCAINE HYDROCHLORIDE 20 MG/ML
INJECTION, SOLUTION EPIDURAL; INFILTRATION; INTRACAUDAL; PERINEURAL AS NEEDED
Status: DISCONTINUED | OUTPATIENT
Start: 2019-01-17 | End: 2019-01-17 | Stop reason: HOSPADM

## 2019-01-17 RX ORDER — DEXTROMETHORPHAN/PSEUDOEPHED 2.5-7.5/.8
1.2 DROPS ORAL
Status: DISCONTINUED | OUTPATIENT
Start: 2019-01-17 | End: 2019-01-17 | Stop reason: HOSPADM

## 2019-01-17 RX ADMIN — PROPOFOL 50 MG: 10 INJECTION, EMULSION INTRAVENOUS at 10:16

## 2019-01-17 RX ADMIN — PROPOFOL 50 MG: 10 INJECTION, EMULSION INTRAVENOUS at 10:19

## 2019-01-17 RX ADMIN — Medication 80 MCG: at 10:26

## 2019-01-17 RX ADMIN — LIDOCAINE HYDROCHLORIDE 50 MG: 20 INJECTION, SOLUTION EPIDURAL; INFILTRATION; INTRACAUDAL; PERINEURAL at 10:16

## 2019-01-17 RX ADMIN — SODIUM CHLORIDE 75 ML/HR: 900 INJECTION, SOLUTION INTRAVENOUS at 10:12

## 2019-01-17 NOTE — DISCHARGE INSTRUCTIONS
General Pompey  [id number]  1950    FLEXIBLE SIGMOIDOSCOPY DISCHARGE INSTRUCTIONS  Discomfort:  Redness at IV site- apply warm compress to area; if redness or soreness persist- contact your physician  There may be a slight amount of blood passed from the rectum  Gaseous discomfort- walking, belching will help relieve any discomfort  You may not operate a vehicle for 12 hours  You may not engage in an occupation involving machinery or appliances for rest of today  You may not drink alcoholic beverages for at least 12 hours  Avoid making any critical decisions for at least 24 hour  DIET:   Regular diet. - however -  remember your colon is empty and a heavy meal will produce gas. Avoid these foods:  vegetables, fried / greasy foods, carbonated drinks for today  MEDICATION:  Per Medication Reconciliation       ACTIVITY:  You may not resume your normal daily activities until tomorrow AM; it is recommended that you spend the remainder of the day resting -  avoid any strenuous activity. CALL M.D. ANY SIGN OF:   Increasing pain, nausea, vomiting  Abdominal distension (swelling)  New increased bleeding (oral or rectal)  Fever (chills)      IMPRESSION:  Impression:    1. Normal Flexible sigmoidoscopy to descending colon  2. Small internal hemorrhoids seen on retroflexion. Given recent normal colonoscopy this is the cause of patient's +FOBT. Also note this is likely cause of abnormal rectal exam by PCP    Recommendations:   1.  Repeat colonoscopy in 2028 (normal 2/18)      Follow-up Instructions:  Telephone # [phone]    Jacky Samuels MD

## 2019-01-17 NOTE — PROGRESS NOTES
General Pompey 1950 
[id number] Situation: 
Verbal report received from: nadir ruelas rn 
Procedure: Procedure(s): FLEXIBLE SIGMOIDOSCOPY (PARTIAL) Background: 
 
Preoperative diagnosis: BLOOD IN STOOL Postoperative diagnosis: hemorrhoids :  Dr. Kristy Murry Assistant(s): Endoscopy Technician-1: Connor Marques Endoscopy RN-1: Tucker Mcintyrer, RN Specimens: * No specimens in log * H. Pylori  no Assessment: 
Intra-procedure medications Anesthesia gave intra-procedure sedation and medications, see anesthesia flow sheet yes Intravenous fluids: NS@ Gabrielle Grady Vital signs stable  yes Abdominal assessment: round and soft  yes Recommendation: 
Discharge patient per MD order yes. Family or Friend  yes Permission to share finding with family or friend yes

## 2019-01-17 NOTE — PROGRESS NOTES
..Anesthesia reports 100mg Propofol, 50mg Lidocaine and 200mL NS given during procedure. Received report from anesthesia staff on vital signs and status of patient.

## 2019-01-17 NOTE — ANESTHESIA POSTPROCEDURE EVALUATION
Procedure(s): FLEXIBLE SIGMOIDOSCOPY (PARTIAL). Anesthesia Post Evaluation Patient location during evaluation: PACU Note status: Adequate. Level of consciousness: responsive to verbal stimuli and sleepy but conscious Pain management: satisfactory to patient Airway patency: patent Anesthetic complications: no 
Cardiovascular status: acceptable Respiratory status: acceptable Hydration status: acceptable Comments: +Post-Anesthesia Evaluation and Assessment Fidelia Campbell MRN: [id number]  SSN: [id number] YOB: 1950  Age: 79 y.o. Sex: male Cardiovascular Function/Vital Signs /51   Pulse 60   Temp 36.6 °C (97.8 °F)   Resp 15   Ht 5' 11\" (1.803 m)   Wt 123.4 kg (272 lb)   SpO2 100%   BMI 37.94 kg/m² Patient is status post Procedure(s): FLEXIBLE SIGMOIDOSCOPY (PARTIAL). Nausea/Vomiting: Controlled. Postoperative hydration reviewed and adequate. Pain: 
Pain Scale 1: Numeric (0 - 10) (01/17/19 0957) Pain Intensity 1: 0 (01/17/19 0957) Managed. Neurological Status: At baseline. Mental Status and Level of Consciousness: Arousable. Pulmonary Status:  
O2 Device: Room air (01/17/19 1029) Adequate oxygenation and airway patent. Complications related to anesthesia: None Post-anesthesia assessment completed. No concerns. Signed By: Ricarda Sauer MD  
 1/17/2019 Post anesthesia nausea and vomiting:  controlled Visit Vitals /51 Pulse 60 Temp 36.6 °C (97.8 °F) Resp 15 Ht 5' 11\" (1.803 m) Wt 123.4 kg (272 lb) SpO2 100% BMI 37.94 kg/m²

## 2019-01-17 NOTE — PROCEDURES
NAME:  General Pompey   :   1950   MRN:   [id number]     Date/Time:  2019 10:22 AM    Flexible Sigmoidoscopy Operative Report    Procedure Type:   Flexible sigmoidoscopy     Indications:     Occult blood in stool  Pre-operative Diagnosis: see indication above  Post-operative Diagnosis:  See findings below  :  Ema Perales MD  Referring Provider: --Elsi Pedraza DO    Exam:  Airway: clear, no airway problems anticipated  Heart: RRR, without gallops or rubs  Lungs: clear bilaterally without wheezes, crackles, or rhonchi  Abdomen: soft, nontender, nondistended, bowel sounds present  Mental Status: awake, alert and oriented to person, place and time    Sedation:  MAC anesthesia Propofol  Procedure Details:  After informed consent was obtained with all risks and benefits of procedure explained and preoperative exam completed, the patient was taken to the endoscopy suite and placed in the left lateral decubitus position. Upon sequential sedation as per above, a digital rectal exam was performed demonstrating internal hemorrhoids. The Olympus videocolonoscope  was inserted in the rectum and carefully advanced to the descending colon. The quality of preparation was good. The colonoscope was slowly withdrawn with careful evaluation between folds. Retroflexion in the rectum was completed demonstrating internal hemorrhoids. Findings:   1. Normal Flexible sigmoidoscopy to descending colon  2. Small internal hemorrhoids seen on retroflexion. Given recent normal colonoscopy this is the cause of patient's +FOBT. Also note this is likely cause of abnormal rectal exam by PCP    Specimen Removed:  None  Complications: None. EBL:  None. Impression:    1. Normal Flexible sigmoidoscopy to descending colon  2. Small internal hemorrhoids seen on retroflexion. Given recent normal colonoscopy this is the cause of patient's +FOBT.  Also note this is likely cause of abnormal rectal exam by PCP    Recommendations:   1. Repeat colonoscopy in 2028 (normal 2/18)      Discharge Disposition:  Home in the company of a  when able to ambulate.       Donny Esparza MD

## 2019-01-17 NOTE — ANESTHESIA PREPROCEDURE EVALUATION
Anesthetic History No history of anesthetic complications Review of Systems / Medical History Patient summary reviewed, nursing notes reviewed and pertinent labs reviewed Pulmonary Sleep apnea: CPAP Smoker Comments: Former smoker Neuro/Psych Within defined limits Cardiovascular Hypertension: well controlled Dysrhythmias Hyperlipidemia Exercise tolerance: >4 METS 
  
GI/Hepatic/Renal 
Within defined limits Endo/Other Obesity and arthritis Other Findings Comments: Hx Low serum testosterone level Physical Exam 
 
Airway Mallampati: II 
TM Distance: > 6 cm Neck ROM: normal range of motion Mouth opening: Normal 
 
 Cardiovascular Regular rate and rhythm,  S1 and S2 normal,  no murmur, click, rub, or gallop Dental 
 
Dentition: Implants and Caps/crowns Pulmonary Breath sounds clear to auscultation Abdominal 
GI exam deferred Other Findings Anesthetic Plan ASA: 2 Anesthesia type: total IV anesthesia Induction: Intravenous Anesthetic plan and risks discussed with: Patient

## 2019-07-09 ENCOUNTER — HOSPITAL ENCOUNTER (INPATIENT)
Age: 69
LOS: 1 days | Discharge: HOME | DRG: 194 | End: 2019-07-10
Attending: EMERGENCY MEDICINE | Admitting: HOSPITALIST
Payer: MEDICARE

## 2019-07-09 ENCOUNTER — APPOINTMENT (OUTPATIENT)
Dept: CT IMAGING | Age: 69
DRG: 194 | End: 2019-07-09
Attending: EMERGENCY MEDICINE
Payer: MEDICARE

## 2019-07-09 ENCOUNTER — APPOINTMENT (OUTPATIENT)
Dept: GENERAL RADIOLOGY | Age: 69
DRG: 194 | End: 2019-07-09
Attending: EMERGENCY MEDICINE
Payer: MEDICARE

## 2019-07-09 DIAGNOSIS — J18.9 PNEUMONIA DUE TO INFECTIOUS ORGANISM, UNSPECIFIED LATERALITY, UNSPECIFIED PART OF LUNG: ICD-10-CM

## 2019-07-09 DIAGNOSIS — R04.2 HEMOPTYSIS: Primary | ICD-10-CM

## 2019-07-09 DIAGNOSIS — N17.9 AKI (ACUTE KIDNEY INJURY) (HCC): ICD-10-CM

## 2019-07-09 PROBLEM — R04.89 PULMONARY HEMORRHAGE: Status: ACTIVE | Noted: 2019-07-09

## 2019-07-09 LAB
ABO + RH BLD: NORMAL
ALBUMIN SERPL-MCNC: 4 G/DL (ref 3.5–5)
ALBUMIN/GLOB SERPL: 1.1 {RATIO} (ref 1.1–2.2)
ALP SERPL-CCNC: 62 U/L (ref 45–117)
ALT SERPL-CCNC: 48 U/L (ref 12–78)
ANION GAP SERPL CALC-SCNC: 2 MMOL/L (ref 5–15)
APTT PPP: 25.6 SEC (ref 22.1–32)
AST SERPL-CCNC: 32 U/L (ref 15–37)
BASOPHILS # BLD: 0 K/UL (ref 0–0.1)
BASOPHILS NFR BLD: 1 % (ref 0–1)
BILIRUB SERPL-MCNC: 1.1 MG/DL (ref 0.2–1)
BLOOD GROUP ANTIBODIES SERPL: NORMAL
BUN SERPL-MCNC: 14 MG/DL (ref 6–20)
BUN/CREAT SERPL: 10 (ref 12–20)
CALCIUM SERPL-MCNC: 9.4 MG/DL (ref 8.5–10.1)
CHLORIDE SERPL-SCNC: 102 MMOL/L (ref 97–108)
CO2 SERPL-SCNC: 36 MMOL/L (ref 21–32)
CREAT SERPL-MCNC: 1.36 MG/DL (ref 0.7–1.3)
DIFFERENTIAL METHOD BLD: ABNORMAL
EOSINOPHIL # BLD: 0.2 K/UL (ref 0–0.4)
EOSINOPHIL NFR BLD: 3 % (ref 0–7)
ERYTHROCYTE [DISTWIDTH] IN BLOOD BY AUTOMATED COUNT: 13.7 % (ref 11.5–14.5)
GLOBULIN SER CALC-MCNC: 3.5 G/DL (ref 2–4)
GLUCOSE SERPL-MCNC: 94 MG/DL (ref 65–100)
HCT VFR BLD AUTO: 45.4 % (ref 36.6–50.3)
HGB BLD-MCNC: 15.3 G/DL (ref 12.1–17)
IMM GRANULOCYTES # BLD AUTO: 0.1 K/UL (ref 0–0.04)
IMM GRANULOCYTES NFR BLD AUTO: 1 % (ref 0–0.5)
INR PPP: 1 (ref 0.9–1.1)
LYMPHOCYTES # BLD: 2.1 K/UL (ref 0.8–3.5)
LYMPHOCYTES NFR BLD: 35 % (ref 12–49)
MAGNESIUM SERPL-MCNC: 2.2 MG/DL (ref 1.6–2.4)
MCH RBC QN AUTO: 28.7 PG (ref 26–34)
MCHC RBC AUTO-ENTMCNC: 33.7 G/DL (ref 30–36.5)
MCV RBC AUTO: 85.2 FL (ref 80–99)
MONOCYTES # BLD: 0.4 K/UL (ref 0–1)
MONOCYTES NFR BLD: 7 % (ref 5–13)
NEUTS SEG # BLD: 3.3 K/UL (ref 1.8–8)
NEUTS SEG NFR BLD: 53 % (ref 32–75)
NRBC # BLD: 0 K/UL (ref 0–0.01)
NRBC BLD-RTO: 0 PER 100 WBC
PLATELET # BLD AUTO: 172 K/UL (ref 150–400)
PMV BLD AUTO: 11.9 FL (ref 8.9–12.9)
POTASSIUM SERPL-SCNC: 3.2 MMOL/L (ref 3.5–5.1)
PROT SERPL-MCNC: 7.5 G/DL (ref 6.4–8.2)
PROTHROMBIN TIME: 10.7 SEC (ref 9–11.1)
RBC # BLD AUTO: 5.33 M/UL (ref 4.1–5.7)
SODIUM SERPL-SCNC: 140 MMOL/L (ref 136–145)
SPECIMEN EXP DATE BLD: NORMAL
THERAPEUTIC RANGE,PTTT: NORMAL SECS (ref 58–77)
WBC # BLD AUTO: 6.1 K/UL (ref 4.1–11.1)

## 2019-07-09 PROCEDURE — 74011250636 HC RX REV CODE- 250/636: Performed by: EMERGENCY MEDICINE

## 2019-07-09 PROCEDURE — 86480 TB TEST CELL IMMUN MEASURE: CPT

## 2019-07-09 PROCEDURE — 94640 AIRWAY INHALATION TREATMENT: CPT

## 2019-07-09 PROCEDURE — 74011636320 HC RX REV CODE- 636/320: Performed by: EMERGENCY MEDICINE

## 2019-07-09 PROCEDURE — 85730 THROMBOPLASTIN TIME PARTIAL: CPT

## 2019-07-09 PROCEDURE — 74011000258 HC RX REV CODE- 258: Performed by: EMERGENCY MEDICINE

## 2019-07-09 PROCEDURE — 83735 ASSAY OF MAGNESIUM: CPT

## 2019-07-09 PROCEDURE — 74011000250 HC RX REV CODE- 250: Performed by: EMERGENCY MEDICINE

## 2019-07-09 PROCEDURE — 36415 COLL VENOUS BLD VENIPUNCTURE: CPT

## 2019-07-09 PROCEDURE — 71275 CT ANGIOGRAPHY CHEST: CPT

## 2019-07-09 PROCEDURE — 85025 COMPLETE CBC W/AUTO DIFF WBC: CPT

## 2019-07-09 PROCEDURE — 80053 COMPREHEN METABOLIC PANEL: CPT

## 2019-07-09 PROCEDURE — 74011636637 HC RX REV CODE- 636/637: Performed by: INTERNAL MEDICINE

## 2019-07-09 PROCEDURE — 99283 EMERGENCY DEPT VISIT LOW MDM: CPT

## 2019-07-09 PROCEDURE — 71046 X-RAY EXAM CHEST 2 VIEWS: CPT

## 2019-07-09 PROCEDURE — 86900 BLOOD TYPING SEROLOGIC ABO: CPT

## 2019-07-09 PROCEDURE — 77030029684 HC NEB SM VOL KT MONA -A

## 2019-07-09 PROCEDURE — 74011000250 HC RX REV CODE- 250: Performed by: INTERNAL MEDICINE

## 2019-07-09 PROCEDURE — 94761 N-INVAS EAR/PLS OXIMETRY MLT: CPT

## 2019-07-09 PROCEDURE — 85610 PROTHROMBIN TIME: CPT

## 2019-07-09 PROCEDURE — 74011250637 HC RX REV CODE- 250/637: Performed by: HOSPITALIST

## 2019-07-09 PROCEDURE — 65660000000 HC RM CCU STEPDOWN

## 2019-07-09 RX ORDER — PREDNISONE 20 MG/1
40 TABLET ORAL
Status: DISCONTINUED | OUTPATIENT
Start: 2019-07-09 | End: 2019-07-10 | Stop reason: HOSPADM

## 2019-07-09 RX ORDER — POTASSIUM CHLORIDE 750 MG/1
40 TABLET, FILM COATED, EXTENDED RELEASE ORAL
Status: COMPLETED | OUTPATIENT
Start: 2019-07-09 | End: 2019-07-09

## 2019-07-09 RX ORDER — IPRATROPIUM BROMIDE AND ALBUTEROL SULFATE 2.5; .5 MG/3ML; MG/3ML
3 SOLUTION RESPIRATORY (INHALATION)
Status: DISCONTINUED | OUTPATIENT
Start: 2019-07-09 | End: 2019-07-10

## 2019-07-09 RX ORDER — IPRATROPIUM BROMIDE AND ALBUTEROL SULFATE 2.5; .5 MG/3ML; MG/3ML
3 SOLUTION RESPIRATORY (INHALATION) ONCE
Status: COMPLETED | OUTPATIENT
Start: 2019-07-09 | End: 2019-07-09

## 2019-07-09 RX ORDER — SODIUM CHLORIDE 0.9 % (FLUSH) 0.9 %
5-40 SYRINGE (ML) INJECTION AS NEEDED
Status: DISCONTINUED | OUTPATIENT
Start: 2019-07-09 | End: 2019-07-10 | Stop reason: HOSPADM

## 2019-07-09 RX ORDER — ONDANSETRON 2 MG/ML
4 INJECTION INTRAMUSCULAR; INTRAVENOUS
Status: DISCONTINUED | OUTPATIENT
Start: 2019-07-09 | End: 2019-07-10 | Stop reason: HOSPADM

## 2019-07-09 RX ORDER — METOPROLOL TARTRATE 25 MG/1
25 TABLET, FILM COATED ORAL EVERY 12 HOURS
Status: DISCONTINUED | OUTPATIENT
Start: 2019-07-09 | End: 2019-07-10 | Stop reason: HOSPADM

## 2019-07-09 RX ORDER — SODIUM CHLORIDE 0.9 % (FLUSH) 0.9 %
10 SYRINGE (ML) INJECTION
Status: COMPLETED | OUTPATIENT
Start: 2019-07-09 | End: 2019-07-09

## 2019-07-09 RX ORDER — AMLODIPINE BESYLATE 5 MG/1
5 TABLET ORAL DAILY
Status: DISCONTINUED | OUTPATIENT
Start: 2019-07-10 | End: 2019-07-10 | Stop reason: HOSPADM

## 2019-07-09 RX ORDER — ONDANSETRON 2 MG/ML
4 INJECTION INTRAMUSCULAR; INTRAVENOUS
Status: DISCONTINUED | OUTPATIENT
Start: 2019-07-09 | End: 2019-07-09

## 2019-07-09 RX ORDER — DILTIAZEM HYDROCHLORIDE 5 MG/ML
5 INJECTION INTRAVENOUS ONCE
Status: DISCONTINUED | OUTPATIENT
Start: 2019-07-09 | End: 2019-07-09

## 2019-07-09 RX ORDER — SODIUM CHLORIDE 0.9 % (FLUSH) 0.9 %
5-40 SYRINGE (ML) INJECTION EVERY 8 HOURS
Status: DISCONTINUED | OUTPATIENT
Start: 2019-07-09 | End: 2019-07-10 | Stop reason: HOSPADM

## 2019-07-09 RX ORDER — EZETIMIBE 10 MG/1
10 TABLET ORAL DAILY
Status: DISCONTINUED | OUTPATIENT
Start: 2019-07-10 | End: 2019-07-10 | Stop reason: HOSPADM

## 2019-07-09 RX ORDER — IPRATROPIUM BROMIDE AND ALBUTEROL SULFATE 2.5; .5 MG/3ML; MG/3ML
3 SOLUTION RESPIRATORY (INHALATION)
Status: DISCONTINUED | OUTPATIENT
Start: 2019-07-09 | End: 2019-07-10 | Stop reason: HOSPADM

## 2019-07-09 RX ORDER — LOSARTAN POTASSIUM 50 MG/1
50 TABLET ORAL DAILY
Status: DISCONTINUED | OUTPATIENT
Start: 2019-07-10 | End: 2019-07-10 | Stop reason: HOSPADM

## 2019-07-09 RX ADMIN — Medication 10 ML: at 21:25

## 2019-07-09 RX ADMIN — Medication 10 ML: at 12:11

## 2019-07-09 RX ADMIN — CEFTRIAXONE 1 G: 1 INJECTION, POWDER, FOR SOLUTION INTRAMUSCULAR; INTRAVENOUS at 13:20

## 2019-07-09 RX ADMIN — IPRATROPIUM BROMIDE AND ALBUTEROL SULFATE 3 ML: .5; 3 SOLUTION RESPIRATORY (INHALATION) at 21:44

## 2019-07-09 RX ADMIN — PREDNISONE 40 MG: 20 TABLET ORAL at 14:32

## 2019-07-09 RX ADMIN — IPRATROPIUM BROMIDE AND ALBUTEROL SULFATE 3 ML: .5; 3 SOLUTION RESPIRATORY (INHALATION) at 16:00

## 2019-07-09 RX ADMIN — AZITHROMYCIN MONOHYDRATE 500 MG: 500 INJECTION, POWDER, LYOPHILIZED, FOR SOLUTION INTRAVENOUS at 14:18

## 2019-07-09 RX ADMIN — IPRATROPIUM BROMIDE AND ALBUTEROL SULFATE 3 ML: .5; 2.5 SOLUTION RESPIRATORY (INHALATION) at 11:30

## 2019-07-09 RX ADMIN — METOPROLOL TARTRATE 25 MG: 25 TABLET ORAL at 14:19

## 2019-07-09 RX ADMIN — POTASSIUM CHLORIDE 40 MEQ: 750 TABLET, FILM COATED, EXTENDED RELEASE ORAL at 14:19

## 2019-07-09 RX ADMIN — IOPAMIDOL 125 ML: 755 INJECTION, SOLUTION INTRAVENOUS at 12:11

## 2019-07-09 RX ADMIN — METOPROLOL TARTRATE 25 MG: 25 TABLET ORAL at 21:24

## 2019-07-09 NOTE — H&P
Hospitalist Admission Note    NAME: General Pompey   :  1950   MRN:  [id number]     Date/Time:  2019 1:51 PM    Patient PCP: Vince Adler MD  ______________________________________________________________________   Assessment & Plan:  Hemoptysis  Right upper PNA vs. Pulmonary hemorrhage  --monitor in stepdown, check coags  --continue empiric abx  --check quantiferon, sputum culture, monitor cbc  --pulmonolology consult pending    Hypokalemia, POA likely due to hctz  --replace potassium, check mag    HTN  --continue home meds  --hold hctz    CKD stage 2  --Cr 1.3, baseline 1.1. Report losartan recently doubled    Hyperlipidemia  B/l leg edema  KENNEY, noncompliant with cpap    Need medication reconciliation  Body mass index is 38.07 kg/m². Code: full  DVT prophylaxis: SCD  Surrogate decision maker:  wife        Subjective:   CHIEF COMPLAINT:  hemoptysis    HISTORY OF PRESENT ILLNESS:     Sol Giles is a 79 y.o.  male with PMH HTN, hyperlipidemia, KENNEY not compliant with cpap, not on home O2, ex-smoker, no hx chronic lung disease or TB, presents with complaint noted above. Patient in usual state of health until last evening had some coughing at bedtime, nonproductive. Woke up early this morning and had some postnasal drainage and coughing with dark red blood (from 1 teaspoon to 1 tablespoon); happened 3 times at home and twice in ER. Denies CP with coughing, SOB. Has chronic edema, not worse from baseline but pcp has increased losartan on last visit due to elevated BP. Denies particular sick contact but works as [employer] . No fever, chills, nightsweats, weight loss. Has occasional sharp midsternal chest pain at rest for months, no aggravating factor, relieves with rubbing chest.  Has chronic numbness in left flank and told it was related to back problem. We were asked to admit for work up and evaluation of the above problems.      Past Medical History:   Diagnosis Date    Arrhythmia     enlarged heart, murmur    Arthritis     HTN (hypertension)     Hypercholesterolemia     Low serum testosterone level     Sleep apnea     wears CPAP at night, does not wear regularly      Past Surgical History:   Procedure Laterality Date    COLONOSCOPY N/A 2018    COLONOSCOPY performed by Celio Ramirez MD at [place] [id number] N/A 2019    FLEXIBLE SIGMOIDOSCOPY (PARTIAL) performed by Samra Dunn MD at [facility] ENDOSCOPY    HX APPENDECTOMY      HX GI      COLONOSCOPY    HX HEENT      LASIK    HX HERNIA REPAIR      MCV    HX ORTHOPAEDIC Right 2012    [place]patient, [place].(Dr. Lani Reich) ROTATOR CUFF    HX TONSILLECTOMY       Social History     Tobacco Use    Smoking status: Former Smoker     Packs/day: 0.50     Last attempt to quit:      Years since quittin.5    Smokeless tobacco: Never Used    Tobacco comment: CIGARS   Substance Use Topics    Alcohol use: No     Frequency: Never    Drug use:  Denies  Retired, part time [employer]     Family History   Problem Relation Age of Onset    Heart Disease Mother     Hypertension Mother     Heart Disease Father          \"Heart failure\" age 76    Hypertension Father     Bleeding Prob Sister         AFTER CHILDBIRTH-BLEEDING    Liver Disease Brother          cirrhosis (ETOH)    Alcohol abuse Brother     Heart Disease Sister         HEART FAILURE    Arthritis-osteo Sister      Allergies   Allergen Reactions    Ace Inhibitors Angioedema     Lip swelling with losartan    Shrimp Hives    Statins-Hmg-Coa Reductase Inhibitors Myalgia        Prior to Admission medications    Medication Sig Start Date End Date Taking? Authorizing Provider   losartan (COZAAR) 100 mg tablet Take 50 mg by mouth daily. Provider, Historical   ezetimibe (ZETIA) 10 mg tablet Take 10 mg by mouth daily.     Provider, Historical   amLODIPine (NORVASC) 5 mg tablet Take 5 mg by mouth daily.    Provider, Historical   folic acid/multivit-min/lutein (CENTRUM SILVER PO) Take  by mouth daily. Provider, Historical   sildenafil, antihypertensive, (REVATIO) 20 mg tablet Take 20 mg by mouth as needed. Provider, Historical   hydroCHLOROthiazide (HYDRODIURIL) 25 mg tablet Take 25 mg by mouth daily. Provider, Historical   metoprolol (LOPRESSOR) 25 mg tablet Take  by mouth two (2) times a day. Provider, Historical   aspirin delayed-release 81 mg tablet Take  by mouth daily. Provider, Historical     REVIEW OF SYSTEMS:  POSITIVE= Bold. Negative = normal text  General:  fever, chills, sweats, generalized weakness, weight gain, loss of appetite  Eyes:  blurred vision, eye pain, loss of vision, diplopia  Ear Nose and Throat:  rhinorrhea, pharyngitis  Respiratory:   cough, sputum production, SOB, wheezing, BURGESS, pleuritic pain  Cardiology:  chest pain, palpitations, orthopnea, PND, edema, syncope   Gastrointestinal:  abdominal pain, N/V, dysphagia, diarrhea, constipation, bleeding  Genitourinary:  frequency, urgency, dysuria, hematuria, incontinence  Muskuloskeletal :  Chronic arthralgia lower back, myalgia  Hematology:  easy bruising, bleeding, lymphadenopathy  Dermatological:  rash, ulceration, pruritis  Endocrine:  hot flashes or polydipsia  Neurological:  headache, dizziness, confusion, focal weakness, paresthesia, memory loss, gait disturbance  Psychological: anxiety, depression, agitation      Objective:   VITALS:    Visit Vitals  BP (!) 150/97   Pulse 70   Temp 99.3 °F (37.4 °C)   Resp 14   Ht 5' 11\" (1.803 m)   Wt 123.8 kg (272 lb 14.9 oz)   SpO2 96%   BMI 38.07 kg/m²     Temp (24hrs), Av.3 °F (37.4 °C), Min:99.3 °F (37.4 °C), Max:99.3 °F (37.4 °C)    Body mass index is 38.07 kg/m². PHYSICAL EXAM:    General:    Alert, cooperative, no distress, appears stated age. HEENT: Atraumatic, anicteric sclerae, pink conjunctivae     No oral ulcers, mucosa moist, throat clear.   Hearing intact. Neck:  Supple, symmetrical,  thyroid: non tender  Lungs:   Clear to auscultation bilaterally. No Wheezing or Rhonchi. No rales. Chest wall:  No tenderness  No Accessory muscle use. Heart:   Regular  rhythm,  No  murmur   No gallop. No edema. Abdomen:   Soft, non-tender. Not distended. Bowel sounds normal. No masses  Extremities: No cyanosis. No clubbing  Skin:     Not pale Not Jaundiced  No rashes   Psych:  Good insight. Not depressed. Not anxious or agitated. Neurologic: EOMs intact. No facial asymmetry. No aphasia or slurred speech. Symmetrical strength, Alert and oriented X 3. IMAGING RESULTS:   []       I have personally reviewed the actual   []     CXR  []     CT scan  CXR:  CT :  EKG:   ________________________________________________________________________  Care Plan discussed with:    Comments   Patient y    [facility]:      ________________________________________________________________________  Prophylaxis:  GI none   DVT SCD   ________________________________________________________________________  Recommended Disposition:   Home with Family y   HH/PT/OT/RN    SNF/LTC    [facility]    ________________________________________________________________________  Code Status:  Full Code y   DNR/DNI    ________________________________________________________________________  TOTAL TIME:  50 minutes  ______________________________________________________________________  Zoie MD Estuardo      Procedures: see electronic medical records for all procedures/Xrays and details which were not copied into this note but were reviewed prior to creation of Plan.     LAB DATA REVIEWED:    Recent Results (from the past 24 hour(s))   CBC WITH AUTOMATED DIFF    Collection Time: 07/09/19 10:12 AM   Result Value Ref Range    WBC 6.1 4.1 - 11.1 K/uL    RBC 5.33 4.10 - 5.70 M/uL    HGB 15.3 12.1 - 17.0 g/dL    HCT 45.4 36.6 - 50.3 %    MCV 85.2 80.0 - 99.0 FL MCH 28.7 26.0 - 34.0 PG    MCHC 33.7 30.0 - 36.5 g/dL    RDW 13.7 11.5 - 14.5 %    PLATELET 858 789 - 067 K/uL    MPV 11.9 8.9 - 12.9 FL    NRBC 0.0 0  WBC    ABSOLUTE NRBC 0.00 0.00 - 0.01 K/uL    NEUTROPHILS 53 32 - 75 %    LYMPHOCYTES 35 12 - 49 %    MONOCYTES 7 5 - 13 %    EOSINOPHILS 3 0 - 7 %    BASOPHILS 1 0 - 1 %    IMMATURE GRANULOCYTES 1 (H) 0.0 - 0.5 %    ABS. NEUTROPHILS 3.3 1.8 - 8.0 K/UL    ABS. LYMPHOCYTES 2.1 0.8 - 3.5 K/UL    ABS. MONOCYTES 0.4 0.0 - 1.0 K/UL    ABS. EOSINOPHILS 0.2 0.0 - 0.4 K/UL    ABS. BASOPHILS 0.0 0.0 - 0.1 K/UL    ABS. IMM. GRANS. 0.1 (H) 0.00 - 0.04 K/UL    DF AUTOMATED     METABOLIC PANEL, COMPREHENSIVE    Collection Time: 07/09/19 10:12 AM   Result Value Ref Range    Sodium 140 136 - 145 mmol/L    Potassium 3.2 (L) 3.5 - 5.1 mmol/L    Chloride 102 97 - 108 mmol/L    CO2 36 (H) 21 - 32 mmol/L    Anion gap 2 (L) 5 - 15 mmol/L    Glucose 94 65 - 100 mg/dL    BUN 14 6 - 20 MG/DL    Creatinine 1.36 (H) 0.70 - 1.30 MG/DL    BUN/Creatinine ratio 10 (L) 12 - 20      GFR est AA >60 >60 ml/min/1.73m2    GFR est non-AA 52 (L) >60 ml/min/1.73m2    Calcium 9.4 8.5 - 10.1 MG/DL    Bilirubin, total 1.1 (H) 0.2 - 1.0 MG/DL    ALT (SGPT) 48 12 - 78 U/L    AST (SGOT) 32 15 - 37 U/L    Alk.  phosphatase 62 45 - 117 U/L    Protein, total 7.5 6.4 - 8.2 g/dL    Albumin 4.0 3.5 - 5.0 g/dL    Globulin 3.5 2.0 - 4.0 g/dL    A-G Ratio 1.1 1.1 - 2.2

## 2019-07-09 NOTE — CONSULTS
[facility]  Pulmonary, Critical Care, and Sleep Medicine    Initial Patient Consult    Name: Emily Borrego MRN: [id number]   : 1950 Hospital: [facility]   Date: 2019        IMPRESSION:   · Acute hemoptysis - bronchitis vs pneumonia; malignancy or vasculitis seem much less likely  · Acute bronchospasm secondary to hemoptysis   · Acute/chronic renal failure  · Remote former smoker (~15 p-y, quit ~30 years ago)      RECOMMENDATIONS:   · On room air  · Bronchodilators  · Empiric antibiotics   · Steroids for bronchospasm  · No acute indication for bronchoscopy; if hemoptysis persists or worsens, this can be considered  · Repeat CT in 8-12 weeks     Subjective: This patient has been seen and evaluated at the request of Dr. Efren Sanders for hemoptysis. Patient is a 79 y.o. male remote former smoker (<15 p-y, quit >30 years ago), presented to the ER today for evaluation of acute onset of hemoptysis. He was in his USOH last night and awoke at ~3 am with dark hemoptysis. No dyspnea. No f/c. Wife has COPD and had become mildly ill recently with a respiratory illness. He last coughed up a small amount of dark blood about an hour ago. Again, he denies any other symptoms.      Past Medical History:   Diagnosis Date    Arrhythmia     enlarged heart, murmur    Arthritis     HTN (hypertension)     Hypercholesterolemia     Low serum testosterone level     Sleep apnea     wears CPAP at night, does not wear regularly      Past Surgical History:   Procedure Laterality Date    COLONOSCOPY N/A 2018    COLONOSCOPY performed by Jose Pritchard MD at [address] N/A 2019    FLEXIBLE SIGMOIDOSCOPY (PARTIAL) performed by Nisha Jameson MD at [facility] ENDOSCOPY    HX APPENDECTOMY      HX GI      COLONOSCOPY    HX HEENT      LASIK    HX HERNIA REPAIR      MCV    HX ORTHOPAEDIC Right 2012    Outpatient, [place].(Dr. Neris Escudero) ROTATOR CUFF    HX TONSILLECTOMY        Prior to Admission medications    Medication Sig Start Date End Date Taking? Authorizing Provider   losartan (COZAAR) 100 mg tablet Take 50 mg by mouth daily. Provider, Historical   ezetimibe (ZETIA) 10 mg tablet Take 10 mg by mouth daily. Provider, Historical   amLODIPine (NORVASC) 5 mg tablet Take 5 mg by mouth daily. Provider, Historical   folic acid/multivit-min/lutein (CENTRUM SILVER PO) Take  by mouth daily. Provider, Historical   sildenafil, antihypertensive, (REVATIO) 20 mg tablet Take 20 mg by mouth as needed. Provider, Historical   hydroCHLOROthiazide (HYDRODIURIL) 25 mg tablet Take 25 mg by mouth daily. Provider, Historical   metoprolol (LOPRESSOR) 25 mg tablet Take  by mouth two (2) times a day. Provider, Historical   aspirin delayed-release 81 mg tablet Take  by mouth daily.     Provider, Historical     Allergies   Allergen Reactions    Ace Inhibitors Angioedema     Lip swelling with losartan    Shrimp Hives    Statins-Hmg-Coa Reductase Inhibitors Myalgia      Social History     Tobacco Use    Smoking status: Former Smoker     Packs/day: 0.50     Last attempt to quit:      Years since quittin.5    Smokeless tobacco: Never Used    Tobacco comment: CIGARS   Substance Use Topics    Alcohol use: No     Frequency: Never      Family History   Problem Relation Age of Onset    Heart Disease Mother     Hypertension Mother     Heart Disease Father          \"Heart failure\" age 76    Hypertension Father     Bleeding Prob Sister         AFTER CHILDBIRTH-BLEEDING    Liver Disease Brother          cirrhosis (ETOH)    Alcohol abuse Brother     Heart Disease Sister         HEART FAILURE    Arthritis-osteo Sister         Current Facility-Administered Medications   Medication Dose Route Frequency    azithromycin (ZITHROMAX) 500 mg in 0.9% sodium chloride (MBP/ADV) 250 mL  500 mg IntraVENous NOW    sodium chloride (NS) flush 5-40 mL  5-40 mL IntraVENous Q8H    [START ON 7/10/2019] cefTRIAXone (ROCEPHIN) 1 g in 0.9% sodium chloride (MBP/ADV) 50 mL  1 g IntraVENous Q24H    [START ON 7/10/2019] azithromycin (ZITHROMAX) 500 mg in 0.9% sodium chloride 250 mL IVPB  500 mg IntraVENous Q24H    potassium chloride SR (KLOR-CON 10) tablet 40 mEq  40 mEq Oral NOW    [START ON 7/10/2019] amLODIPine (NORVASC) tablet 5 mg  5 mg Oral DAILY    [START ON 7/10/2019] ezetimibe (ZETIA) tablet 10 mg  10 mg Oral DAILY    metoprolol tartrate (LOPRESSOR) tablet 25 mg  25 mg Oral Q12H    [START ON 7/10/2019] losartan (COZAAR) tablet 50 mg  50 mg Oral DAILY    dilTIAZem (CARDIZEM) injection 5 mg  5 mg IntraVENous ONCE       Review of Systems:  A comprehensive review of systems was negative except for that written in the HPI. Objective:   Vital Signs:    Visit Vitals  BP (!) 150/97   Pulse 70   Temp 99.3 °F (37.4 °C)   Resp 14   Ht 5' 11\" (1.803 m)   Wt 123.8 kg (272 lb 14.9 oz)   SpO2 96%   BMI 38.07 kg/m²       O2 Device: Room air       Temp (24hrs), Av.3 °F (37.4 °C), Min:99.3 °F (37.4 °C), Max:99.3 °F (37.4 °C)       Intake/Output:   Last shift:      No intake/output data recorded. Last 3 shifts: No intake/output data recorded. No intake or output data in the 24 hours ending 19 1419   Physical Exam:   General:  Alert, cooperative, no distress, appears stated age. Head:  Normocephalic, without obvious abnormality, atraumatic. Eyes:  Conjunctivae/corneas clear. Nose: Nares normal. Septum midline. Mucosa normal.     Throat: Lips, mucosa, and tongue normal. Teeth and gums normal.   Neck: Supple, symmetrical, trachea midline    Back:   Symmetric, no curvature. ROM normal.   Lungs:   Diffuse wheeze and ronchi   Chest wall:  No tenderness or deformity. Heart:  Regular rate and rhythm, +murmur, +edema   Abdomen:   Soft, non-tender.  Bowel sounds normal.     Extremities: Extremities normal, atraumatic, no cyanosis or clubbing   Skin: Skin color, texture, turgor normal. No rashes or lesions   Lymph nodes: Cervical, supraclavicular nodes normal.   Neurologic: Grossly nonfocal     Data review:     Recent Results (from the past 24 hour(s))   CBC WITH AUTOMATED DIFF    Collection Time: 07/09/19 10:12 AM   Result Value Ref Range    WBC 6.1 4.1 - 11.1 K/uL    RBC 5.33 4.10 - 5.70 M/uL    HGB 15.3 12.1 - 17.0 g/dL    HCT 45.4 36.6 - 50.3 %    MCV 85.2 80.0 - 99.0 FL    MCH 28.7 26.0 - 34.0 PG    MCHC 33.7 30.0 - 36.5 g/dL    RDW 13.7 11.5 - 14.5 %    PLATELET 265 307 - 143 K/uL    MPV 11.9 8.9 - 12.9 FL    NRBC 0.0 0  WBC    ABSOLUTE NRBC 0.00 0.00 - 0.01 K/uL    NEUTROPHILS 53 32 - 75 %    LYMPHOCYTES 35 12 - 49 %    MONOCYTES 7 5 - 13 %    EOSINOPHILS 3 0 - 7 %    BASOPHILS 1 0 - 1 %    IMMATURE GRANULOCYTES 1 (H) 0.0 - 0.5 %    ABS. NEUTROPHILS 3.3 1.8 - 8.0 K/UL    ABS. LYMPHOCYTES 2.1 0.8 - 3.5 K/UL    ABS. MONOCYTES 0.4 0.0 - 1.0 K/UL    ABS. EOSINOPHILS 0.2 0.0 - 0.4 K/UL    ABS. BASOPHILS 0.0 0.0 - 0.1 K/UL    ABS. IMM. GRANS. 0.1 (H) 0.00 - 0.04 K/UL    DF AUTOMATED     METABOLIC PANEL, COMPREHENSIVE    Collection Time: 07/09/19 10:12 AM   Result Value Ref Range    Sodium 140 136 - 145 mmol/L    Potassium 3.2 (L) 3.5 - 5.1 mmol/L    Chloride 102 97 - 108 mmol/L    CO2 36 (H) 21 - 32 mmol/L    Anion gap 2 (L) 5 - 15 mmol/L    Glucose 94 65 - 100 mg/dL    BUN 14 6 - 20 MG/DL    Creatinine 1.36 (H) 0.70 - 1.30 MG/DL    BUN/Creatinine ratio 10 (L) 12 - 20      GFR est AA >60 >60 ml/min/1.73m2    GFR est non-AA 52 (L) >60 ml/min/1.73m2    Calcium 9.4 8.5 - 10.1 MG/DL    Bilirubin, total 1.1 (H) 0.2 - 1.0 MG/DL    ALT (SGPT) 48 12 - 78 U/L    AST (SGOT) 32 15 - 37 U/L    Alk.  phosphatase 62 45 - 117 U/L    Protein, total 7.5 6.4 - 8.2 g/dL    Albumin 4.0 3.5 - 5.0 g/dL    Globulin 3.5 2.0 - 4.0 g/dL    A-G Ratio 1.1 1.1 - 2.2     MAGNESIUM    Collection Time: 07/09/19 10:12 AM   Result Value Ref Range    Magnesium 2.2 1.6 - 2.4 mg/dL Imaging:  I have personally reviewed the patients radiographs and have reviewed the reports:  Mild inflammatory area in the right upper lobe, could be pneumonia vs hemorrhage         Garo Landers MD

## 2019-07-09 NOTE — ED PROVIDER NOTES
EMERGENCY DEPARTMENT HISTORY AND PHYSICAL EXAM      Date: 7/9/2019  Patient Name: Emerick Scheuermann    History of Presenting Illness     Chief Complaint   Patient presents with    Cough     coughing up blood that started this morning       History Provided By: Patient    HPI: Emerick Scheuermann, 79 y.o. male with PMHx as noted below presents the emergency department with chief complaint of hemoptysis. Patient states that last night began coughing and noticed some dark blood in his sputum. Patient states this persisted throughout the day so presented to the emergency department for evaluation. Patient does note some very mild dyspnea and fatigue but otherwise is having no pain with the cough, other chest pain, abdominal pain, nausea, vomiting, diarrhea. Has had no fevers, chills, weight loss. PCP: Zenobia Macias, DO    Current Outpatient Medications   Medication Sig Dispense Refill    aspirin delayed-release 81 mg tablet We are holding until hemoptysis get resolves, resume week after blood in sputum gets resolved 30 Tab 0    azithromycin (ZITHROMAX) 500 mg tab Take 1 Tab by mouth daily for 5 days. 5 Tab 0    cefdinir (OMNICEF) 300 mg capsule Take 1 Cap by mouth two (2) times a day for 5 days. 10 Cap 0    predniSONE (DELTASONE) 20 mg tablet 5 tabs daily for 3 days 15 Tab 0    losartan (COZAAR) 100 mg tablet Take 100 mg by mouth daily.  ezetimibe (ZETIA) 10 mg tablet Take 10 mg by mouth daily.  amLODIPine (NORVASC) 2.5 mg tablet Take 2.5 mg by mouth daily.  folic acid/multivit-min/lutein (CENTRUM SILVER PO) Take 1 Tab by mouth daily.  hydroCHLOROthiazide (HYDRODIURIL) 25 mg tablet Take 25 mg by mouth daily.  metoprolol tartrate (LOPRESSOR) 50 mg tablet Take 50 mg by mouth two (2) times a day.          Past History     Past Medical History:  Past Medical History:   Diagnosis Date    Arrhythmia     enlarged heart, murmur    Arthritis     HTN (hypertension)     Hypercholesterolemia  Low serum testosterone level     Sleep apnea     wears CPAP at night, does not wear regularly       Past Surgical History:  Past Surgical History:   Procedure Laterality Date    COLONOSCOPY N/A 2018    COLONOSCOPY performed by Renée Rhodes MD at [place] [address] N/A 2019    FLEXIBLE SIGMOIDOSCOPY (PARTIAL) performed by Dorita Stover [facility] at [facility] ENDOSCOPY    HX APPENDECTOMY      HX GI      COLONOSCOPY    HX HEENT      LASIK    [facility] HERNIA REPAIR  1969    [facility]    HX ORTHOPAEDIC Right 2012    [place].(Dr. Román Tran) ROTATOR CUFF    HX TONSILLECTOMY         Family History:  Family History   Problem Relation Age of Onset    Heart Disease Mother     Hypertension Mother     Heart Disease Father          \"Heart failure\" age 76    Hypertension Father     Bleeding Prob Sister         AFTER CHILDBIRTH-BLEEDING    Liver Disease Brother          cirrhosis (ETOH)    Alcohol abuse Brother     Heart Disease Sister         HEART FAILURE    Arthritis-osteo Sister        Social History:  Social History     Tobacco Use    Smoking status: Former Smoker     Packs/day: 0.50     Last attempt to quit:      Years since quittin.5    Smokeless tobacco: Never Used    Tobacco comment: CIGARS   Substance Use Topics    Alcohol use: No     Frequency: Never    Drug use: No       Allergies: Allergies   Allergen Reactions    Ace Inhibitors Angioedema     Lip swelling with losartan    Shrimp Hives    Statins-Hmg-Coa Reductase Inhibitors Myalgia         Review of Systems   Review of Systems  Constitutional: Negative for fever, chills, and fatigue. HENT: Negative for congestion, sore throat, rhinorrhea, sneezing and neck stiffness   Eyes: Negative for discharge and redness.    Respiratory: Positive for  shortness of breath, wheezing   Cardiovascular: Negative for chest pain, palpitations   Gastrointestinal: Negative for nausea, vomiting, abdominal pain, constipation, diarrhea and blood in stool. Genitourinary: Negative for dysuria, hematuria, flank pain, decreased urine volume, discharge,   Musculoskeletal: Negative for myalgias or joint pain . Skin: Negative for rash or lesions . Neurological: Negative weakness, light-headedness, numbness and headaches. Physical Exam   Physical Exam    GENERAL: alert and oriented, no acute distress  EYES: PEERL, No injection, discharge or icterus. ENT: Mucous membranes pink and moist.  NECK: Supple  LUNGS: Airway patent. Non-labored respirations. Coarse breath sounds bilaterally  HEART: Regular rate and rhythm. No peripheral edema  ABDOMEN: Non-distended and non-tender, without guarding or rebound. SKIN:  warm, dry  MSK/EXTREMITIES: Without swelling, tenderness or deformity, symmetric with normal ROM  NEUROLOGICAL: Alert, oriented      Diagnostic Study Results     Labs -     Reviewed    Radiologic Studies -   CTA CHEST W OR W WO CONT   Final Result   IMPRESSION:    1. Patchy airspace disease in the right lung apex which may represent pneumonia   and/or hemorrhage/hemoptysis. 2. No visible pulmonary embolus. 3. Incidental findings as above. XR CHEST PA LAT   Final Result   IMPRESSION:      No acute process. CT Results  (Last 48 hours)    None        CXR Results  (Last 48 hours)    None            Medical Decision Making   I am the first provider for this patient. I reviewed the vital signs, available nursing notes, past medical history, past surgical history, family history and social history. Vital Signs-Reviewed the patient's vital signs. Records Reviewed: Nursing Notes and Old Medical Records    Provider Notes (Medical Decision Making): On presentation, the patient is well appearing, in no acute distress with normal vital signs. Based on my history and exam the differential diagnosis for this patient includes lung cancer, pulmonary embolism, bronchitis, pneumonia.   Will obtain basic labs as well as a CTA of the chest with contrast for further evaluation. ED Course:   Initial assessment performed. The patients presenting problems have been discussed, and they are in agreement with the care plan formulated and outlined with them. I have encouraged them to ask questions as they arise throughout their visit. PROGRESS NOTE:  The patient has been re-evaluated and does feel somewhat better. CT scan showed area of pneumonia versus hemorrhage. Given the degree of hemoptysis, feel it safer to admit for observation. We will plan to discuss with pulmonology. Reviewed available results with patient and have counseled them on diagnosis and care plan. They have expressed understanding, and all their questions have been answered. They agree with plan     Admit Note  Patient is being admitted by the hospitalist.  The results of their tests and reasons for their admission have been discussed with them and/or available family. They convey agreement and understanding for the need to be admitted and for their admission diagnosis. Consultation has been made with the inpatient physician specialist for hospitalization. Disposition:  Admit    PLAN:  1. Antibiotics  2. Pulmonology consultation  3. Admit to the hospitalist      Diagnosis     Clinical Impression:   1. Hemoptysis    2. Pneumonia due to infectious organism, unspecified laterality, unspecified part of lung    3.  NAYELI (acute kidney injury) ([facility] [address]

## 2019-07-09 NOTE — PROGRESS NOTES
Reason for Admission:   Pulmonary hemorrhage, PNA, hemoptysis                   RRAT Score:          7 low risk           Plan for utilizing home health:   TBD                         Current Advanced Directive/Advance Care Plan: on file 3/2015                         Transition of Care Plan:                 1.  Patient in ED bed waiting for inpatient admission  2. Patient will need 2nd IM letter at discharge  3. Patient prefers to discharge home with family assistance and follow up appointments. Patient is a 79year old male admitted 7/9 for PNA and hemoptysis. Patient alert and oriented, resting in bed, no visitors present. Demographic information verified and correct. Insurance information verified and correct. Patient lives with wife and great-granddaughter, no home oxygen, no DME, has not used home health in the past.  Patient uses [facility]	[place] on [address] [facility]  Patient states he is independent with ADLs and can drive. Patient's wife can transport at discharge. Care Management Interventions  PCP Verified by CM: Cyndy López MD)  Mode of Transport at Discharge:  Other (see comment)(pt' swife can transport at [facility])  Transition of Care Consult (CM Consult): Discharge Planning  Discharge Durable Medical Equipment: No  Physical Therapy Consult: No  Occupational Therapy Consult: No  Speech Therapy Consult: No  Current Support Network: Lives with Spouse, Own Home(lives with wife and great-granddaughter, 1 story house, 3 steps to enter)  Confirm Follow Up Transport: Family  Plan discussed with Pt/Family/Caregiver: Yes  Discharge Location  Discharge Placement: [address], RN, [facility]  [phone]

## 2019-07-10 ENCOUNTER — HOME HEALTH ADMISSION (OUTPATIENT)
Dept: HOME HEALTH SERVICES | Facility: HOME HEALTH | Age: 69
End: 2019-07-10

## 2019-07-10 VITALS
RESPIRATION RATE: 16 BRPM | BODY MASS INDEX: 38.21 KG/M2 | WEIGHT: 272.93 LBS | HEIGHT: 71 IN | DIASTOLIC BLOOD PRESSURE: 87 MMHG | HEART RATE: 70 BPM | TEMPERATURE: 98.5 F | SYSTOLIC BLOOD PRESSURE: 137 MMHG | OXYGEN SATURATION: 98 %

## 2019-07-10 LAB
ANION GAP SERPL CALC-SCNC: 7 MMOL/L (ref 5–15)
BASOPHILS # BLD: 0 K/UL (ref 0–0.1)
BASOPHILS NFR BLD: 0 % (ref 0–1)
BUN SERPL-MCNC: 13 MG/DL (ref 6–20)
BUN/CREAT SERPL: 10 (ref 12–20)
CALCIUM SERPL-MCNC: 9.5 MG/DL (ref 8.5–10.1)
CHLORIDE SERPL-SCNC: 102 MMOL/L (ref 97–108)
CO2 SERPL-SCNC: 30 MMOL/L (ref 21–32)
CREAT SERPL-MCNC: 1.31 MG/DL (ref 0.7–1.3)
DIFFERENTIAL METHOD BLD: ABNORMAL
EOSINOPHIL # BLD: 0 K/UL (ref 0–0.4)
EOSINOPHIL NFR BLD: 0 % (ref 0–7)
ERYTHROCYTE [DISTWIDTH] IN BLOOD BY AUTOMATED COUNT: 13.8 % (ref 11.5–14.5)
GLUCOSE SERPL-MCNC: 128 MG/DL (ref 65–100)
HCT VFR BLD AUTO: 42 % (ref 36.6–50.3)
HGB BLD-MCNC: 14.3 G/DL (ref 12.1–17)
IMM GRANULOCYTES # BLD AUTO: 0.1 K/UL (ref 0–0.04)
IMM GRANULOCYTES NFR BLD AUTO: 1 % (ref 0–0.5)
LYMPHOCYTES # BLD: 0.8 K/UL (ref 0.8–3.5)
LYMPHOCYTES NFR BLD: 10 % (ref 12–49)
MCH RBC QN AUTO: 28.6 PG (ref 26–34)
MCHC RBC AUTO-ENTMCNC: 34 G/DL (ref 30–36.5)
MCV RBC AUTO: 84 FL (ref 80–99)
MONOCYTES # BLD: 0.1 K/UL (ref 0–1)
MONOCYTES NFR BLD: 2 % (ref 5–13)
NEUTS SEG # BLD: 7 K/UL (ref 1.8–8)
NEUTS SEG NFR BLD: 87 % (ref 32–75)
NRBC # BLD: 0 K/UL (ref 0–0.01)
NRBC BLD-RTO: 0 PER 100 WBC
PLATELET # BLD AUTO: 171 K/UL (ref 150–400)
PMV BLD AUTO: 12.1 FL (ref 8.9–12.9)
POTASSIUM SERPL-SCNC: 3.2 MMOL/L (ref 3.5–5.1)
RBC # BLD AUTO: 5 M/UL (ref 4.1–5.7)
SODIUM SERPL-SCNC: 139 MMOL/L (ref 136–145)
WBC # BLD AUTO: 8 K/UL (ref 4.1–11.1)

## 2019-07-10 PROCEDURE — 74011250636 HC RX REV CODE- 250/636: Performed by: HOSPITALIST

## 2019-07-10 PROCEDURE — 36415 COLL VENOUS BLD VENIPUNCTURE: CPT

## 2019-07-10 PROCEDURE — 74011636637 HC RX REV CODE- 636/637: Performed by: INTERNAL MEDICINE

## 2019-07-10 PROCEDURE — 74011000258 HC RX REV CODE- 258: Performed by: HOSPITALIST

## 2019-07-10 PROCEDURE — 74011250637 HC RX REV CODE- 250/637: Performed by: HOSPITALIST

## 2019-07-10 PROCEDURE — 85025 COMPLETE CBC W/AUTO DIFF WBC: CPT

## 2019-07-10 PROCEDURE — 80048 BASIC METABOLIC PNL TOTAL CA: CPT

## 2019-07-10 RX ORDER — AZITHROMYCIN 500 MG/1
500 TABLET, FILM COATED ORAL DAILY
Qty: 5 TAB | Refills: 0 | Status: SHIPPED | OUTPATIENT
Start: 2019-07-10 | End: 2019-07-15

## 2019-07-10 RX ORDER — POTASSIUM CHLORIDE 20 MEQ/1
40 TABLET, EXTENDED RELEASE ORAL
Status: DISCONTINUED | OUTPATIENT
Start: 2019-07-10 | End: 2019-07-10 | Stop reason: HOSPADM

## 2019-07-10 RX ORDER — PREDNISONE 20 MG/1
TABLET ORAL
Qty: 15 TAB | Refills: 0 | Status: SHIPPED | OUTPATIENT
Start: 2019-07-10 | End: 2020-01-14 | Stop reason: CLARIF

## 2019-07-10 RX ORDER — CEFDINIR 300 MG/1
300 CAPSULE ORAL 2 TIMES DAILY
Qty: 10 CAP | Refills: 0 | Status: SHIPPED | OUTPATIENT
Start: 2019-07-10 | End: 2019-07-15

## 2019-07-10 RX ORDER — ASPIRIN 81 MG/1
TABLET ORAL
Qty: 30 TAB | Refills: 0 | Status: SHIPPED
Start: 2019-07-10

## 2019-07-10 RX ORDER — IPRATROPIUM BROMIDE AND ALBUTEROL SULFATE 2.5; .5 MG/3ML; MG/3ML
3 SOLUTION RESPIRATORY (INHALATION)
Status: DISCONTINUED | OUTPATIENT
Start: 2019-07-10 | End: 2019-07-10 | Stop reason: HOSPADM

## 2019-07-10 RX ADMIN — CEFTRIAXONE 1 G: 1 INJECTION, POWDER, FOR SOLUTION INTRAMUSCULAR; INTRAVENOUS at 11:57

## 2019-07-10 RX ADMIN — Medication 10 ML: at 06:00

## 2019-07-10 NOTE — DISCHARGE SUMMARY
Hospitalist Discharge Summary     Patient ID:  Rayshawn Roberts  [id number]  79 y.o.  1950 7/9/2019    PCP on record: [employer] [facility],     Admit date: 7/9/2019  Discharge date and time: 7/10/2019    DISCHARGE DIAGNOSIS:  Hemoptysis  Right upper PNA vs Pulmonary hemorrhage  Hypokalemia  HTN  CKD stage 2  Hyperlipidemia  B/l leg edema  KENNEY    CONSULTATIONS:  IP CONSULT TO PULMONOLOGY  IP CONSULT TO HOSPITALIST    Excerpted HPI from H&P of Carla Gale MD:  Rayshawn Roberts is a 79 y.o.  male with PMH HTN, hyperlipidemia, KENNEY not compliant with cpap, not on home O2, ex-smoker, no hx chronic lung disease or TB, presents with complaint noted above.      Patient in usual state of health until last evening had some coughing at bedtime, nonproductive. Woke up early this morning and had some postnasal drainage and coughing with dark red blood (from 1 teaspoon to 1 tablespoon); happened 3 times at home and twice in ER. Denies CP with coughing, SOB. Has chronic edema, not worse from baseline but pcp has increased losartan on last visit due to elevated BP. Denies particular sick contact but works as [employer] . No fever, chills, nightsweats, weight loss.     Has occasional sharp midsternal chest pain at rest for months, no aggravating factor, relieves with rubbing chest.  Has chronic numbness in left flank and told it was related to back problem. We were asked to admit for work up and evaluation of the above problems.      ______________________________________________________________________  DISCHARGE SUMMARY/HOSPITAL COURSE:  for full details see H&P, daily progress notes, labs, consult notes. Hemoptysis  Right upper PNA vs Pulmonary hemorrhage  Possible side affect from Viagra as uses regularly and can have pulmonary hemorrhage with it  Complete coarse of empiric abx  Quantiferon sent, no cavitary lesions on CT, low suspicion for TB as per discussion with pulmonary. Pulmonary will follow patient as an OP  5 days burst course of steroids per pulmonary  I have discussed with patient to avoid viagra with likely side affect and he understands     Hypokalemia POA   Replete again, recommend OP repeat blood work with PCP     HTN  Continue home meds     CKD stage 3  Cr stable at baseline     Hyperlipidemia  B/l leg edema  KENNEY, noncompliant with cpap     Morbid Obesity with Body mass index is 38.07 kg/m².    _______________________________________________________________________  Patient seen and examined by me on discharge day. Pertinent Findings:  Gen:    Not in distress  Chest: Clear lungs  CVS:   Regular rhythm. No edema  Abd:  Soft, not distended, not tender  Neuro:  Alert, non focal  _______________________________________________________________________  DISCHARGE MEDICATIONS:   Current Discharge Medication List      START taking these medications    Details   azithromycin (ZITHROMAX) 500 mg tab Take 1 Tab by mouth daily for 5 days. Qty: 5 Tab, Refills: 0      cefdinir (OMNICEF) 300 mg capsule Take 1 Cap by mouth two (2) times a day for 5 days. Qty: 10 Cap, Refills: 0      predniSONE (DELTASONE) 20 mg tablet 5 tabs daily for 3 days  Qty: 15 Tab, Refills: 0         CONTINUE these medications which have CHANGED    Details   aspirin delayed-release 81 mg tablet We are holding until hemoptysis get resolves, resume week after blood in sputum gets resolved  Qty: 30 Tab, Refills: 0         CONTINUE these medications which have NOT CHANGED    Details   losartan (COZAAR) 100 mg tablet Take 50 mg by mouth daily. ezetimibe (ZETIA) 10 mg tablet Take 10 mg by mouth daily. amLODIPine (NORVASC) 5 mg tablet Take 5 mg by mouth daily. folic acid/multivit-min/lutein (CENTRUM SILVER PO) Take  by mouth daily. hydroCHLOROthiazide (HYDRODIURIL) 25 mg tablet Take 25 mg by mouth daily. metoprolol (LOPRESSOR) 25 mg tablet Take  by mouth two (2) times a day.          STOP taking these medications       sildenafil, antihypertensive, (REVATIO) 20 mg tablet Comments:   Reason for Stopping:                  Follow-up Information     Follow up With Specialties Details Why Contact Info    Christiano Rasheed DO Family Practice Schedule an appointment as soon as possible for a visit in 1 week  [address] [facility] [address] Nicole Medina MD Pulmonary Disease  Case management will give you appointment before you leave [facility] [address] [facility]  [facility]  [facility] [address]  [address]  [phone]          ________________________________________________________________    Risk of deterioration: Low    Condition at Discharge:  Stable  __________________________________________________________________    Disposition  Home with family, no needs    ____________________________________________________________________    Code Status: Full Code  ___________________________________________________________________      Total time in minutes spent coordinating this discharge (includes going over instructions, follow-up, prescriptions, and preparing report for sign off to her PCP) :  35 minutes    Signed:  Vianney Hernandez MD

## 2019-07-10 NOTE — DISCHARGE INSTRUCTIONS
HOSPITALIST DISCHARGE INSTRUCTIONS    NAME: General Pompey   :  1950   MRN:  [id number]     Date/Time:  7/10/2019 11:53 AM    ADMIT DATE: 2019     DISCHARGE DATE: 7/10/2019     DISCHARGE DIAGNOSIS:  Hemoptysis  Right upper PNA vs Pulmonary hemorrhage  Hypokalemia  HTN  CKD stage 2  Hyperlipidemia  B/l leg edema  KENNEY    MEDICATIONS:  · It is important that you take the medication exactly as they are prescribed. · Keep your medication in the bottles provided by the pharmacist and keep a list of the medication names, dosages, and times to be taken in your wallet. · Do not take other medications without consulting your doctor. Pain Management: per above medications    What to do at Home    Recommended diet:  Resume previous diet    Recommended activity: Activity as tolerated    If you have questions regarding the hospital related prescriptions or [facility] related issues [phone]. If you experience any of the following symptoms then please call your primary care physician or return to the emergency room if you cannot get hold of your doctor:  Fever, chills, nausea, vomiting, diarrhea, change in mentation, falling, bleeding, shortness of breath        Information obtained by :  I understand that if any problems occur once I am at home I am to contact my physician. I understand and acknowledge receipt of the instructions indicated above.                                                                                                                                            Physician's or R.N.'s Signature                                                                  Date/Time                                                                                                                                              Patient or Representative Signature                                                          Date/Time

## 2019-07-10 NOTE — PROGRESS NOTES
ADULT PROTOCOL: JET AEROSOL ASSESSMENT    Patient  General Pompey     79 y.o.   male     7/9/2019  10:21 PM    Breath Sounds Pre Procedure: Right Breath Sounds: Diminished                               Left Breath Sounds: Diminished    Breath Sounds Post Procedure: Right Breath Sounds: Diminished                                 Left Breath Sounds: Diminished    Breathing pattern: Pre procedure Breathing Pattern: Regular          Post procedure Breathing Pattern: Regular    Heart Rate: Pre procedure Pulse: 69           Post procedure Pulse: 90    Resp Rate: Pre procedure Respirations: 16           Post procedure Respirations: 16    Peak Flow: Pre bronchodilator             Post bronchodilator       FVC/FEV1:  n/a    Incentive Spirometry:             Cough: Pre procedure                 Post procedure      Suctioned: NO    Sputum: Pre procedure                   Post procedure      Oxygen: O2 Device: Room air   RA     Changed: NO    SpO2: Pre procedure SpO2: 96 %   without oxygen              Post procedure SpO2: 96 %  without oxygen    Nebulizer Therapy: Current medications Aerosolized Medications: DuoNeb      Changed: NO    Smoking History: n/a    Problem List:   Patient Active Problem List   Diagnosis Code    HTN (hypertension) I10    Hypercholesterolemia E78.00    Encounter for long-term (current) use of other medications Z79.899    Low serum testosterone level R79.89    Pulmonary hemorrhage R04.89    Pneumonia J18.9    Hemoptysis R04.2       Respiratory Therapist: Adelene Hodgkins, RT Podiatry

## 2019-07-10 NOTE — PROGRESS NOTES
CM met with pt and discussed Hospital to Home visit and he was in agreement. Referral sent to [facility] via [facility] and they accepted. CM consult noted for appt with Dr. Mary Goodman in 1-2 weeks. CM contacted Dr. Vicky Arroyo office and made the appt. Pt will get family to transport him home. Pt stated his truck is at the hospital. Medicare pt has received, reviewed, and signed 2nd IM letter informing them of their right to appeal the discharge. Signed copy has been placed on pt bedside chart. Care Management Interventions  PCP Verified by CM: Shanice Sarmiento MD)  Mode of Transport at Discharge:  Other (see comment)(family transported pt by car)  Transition of Care Consult (CM Consult): Discharge Planning  Discharge Durable Medical Equipment: No  Physical Therapy Consult: No  Occupational Therapy Consult: No  Speech Therapy Consult: No  Current Support Network: Lives with Spouse, Own Home(lives with wife and great-granddaughter, 1 story house, 3 steps to enter)  Confirm Follow Up Transport: Family  Plan discussed with Pt/Family/Caregiver: Yes  Discharge Location  Discharge Placement: [address] [place] [address] [id number]

## 2019-07-10 NOTE — PROGRESS NOTES
[facility]  Pulmonary, Critical Care, and Sleep Medicine    Name: Jesús Loredo MRN: [id number]   : 1950 Hospital: [facility]   Date: 7/10/2019        IMPRESSION:   · Acute hemoptysis - bronchitis vs pneumonia; malignancy or vasculitis seem much less likely  · Acute bronchospasm secondary to hemoptysis   · Acute/chronic renal failure  · Remote former smoker (~15 p-y, quit ~30 years ago)      RECOMMENDATIONS:   · On room air  · Bronchodilators  · Empiric antibiotics   · Steroid burst  · Recommend follow up in 1-2 weeks in our office to go over lab results and ensure hemoptysis has resolved and would plan on repeat CT in 8-12 weeks  · Nothing else to add at this time. Subjective:     7-10-19: no events. Hemoptysis markedly improved. Few streaks with his mucus. No dyspnea. Was placed on airborne precautions by the hospitalist service, though no AFB studies were ordered    Initial history: This patient has been seen and evaluated at the request of Dr. Haroldo Watson for hemoptysis. Patient is a 79 y.o. male remote former smoker (<15 p-y, quit >30 years ago), presented to the ER today for evaluation of acute onset of hemoptysis. He was in his USOH last night and awoke at ~3 am with dark hemoptysis. No dyspnea. No f/c. Wife has COPD and had become mildly ill recently with a respiratory illness. He last coughed up a small amount of dark blood about an hour ago. Again, he denies any other symptoms.      Past Medical History:   Diagnosis Date    Arrhythmia     enlarged heart, murmur    Arthritis     HTN (hypertension)     Hypercholesterolemia     Low serum testosterone level     Sleep apnea     wears CPAP at night, does not wear regularly      Past Surgical History:   Procedure Laterality Date    COLONOSCOPY N/A 2018    COLONOSCOPY performed by Tiffanie Wright MD at [place] [address] N/A 2019    FLEXIBLE SIGMOIDOSCOPY (PARTIAL) performed by Willian Mccall MD at [facility] ENDOSCOPY    HX APPENDECTOMY      HX GI      COLONOSCOPY    HX HEENT      LASIK     [address]    [facility]    HX ORTHOPAEDIC Right 2012    [place], [place].(Dr. Thelda Holstein) ROTATOR CUFF    HX TONSILLECTOMY        Prior to Admission medications    Medication Sig Start Date End Date Taking? Authorizing Provider   losartan (COZAAR) 100 mg tablet Take 50 mg by mouth daily. Provider, Historical   ezetimibe (ZETIA) 10 mg tablet Take 10 mg by mouth daily. Provider, Historical   amLODIPine (NORVASC) 5 mg tablet Take 5 mg by mouth daily. Provider, Historical   folic acid/multivit-min/lutein (CENTRUM SILVER PO) Take  by mouth daily. Provider, Historical   sildenafil, antihypertensive, (REVATIO) 20 mg tablet Take 20 mg by mouth as needed. Provider, Historical   hydroCHLOROthiazide (HYDRODIURIL) 25 mg tablet Take 25 mg by mouth daily. Provider, Historical   metoprolol (LOPRESSOR) 25 mg tablet Take  by mouth two (2) times a day. Provider, Historical   aspirin delayed-release 81 mg tablet Take  by mouth daily.     Provider, Historical     Allergies   Allergen Reactions    Ace Inhibitors Angioedema     Lip swelling with losartan    Shrimp Hives    Statins-Hmg-Coa Reductase Inhibitors Myalgia      Social History     Tobacco Use    Smoking status: Former Smoker     Packs/day: 0.50     Last attempt to quit:      Years since quittin.5    Smokeless tobacco: Never Used    Tobacco comment: CIGARS   Substance Use Topics    Alcohol use: No     Frequency: Never      Family History   Problem Relation Age of Onset    Heart Disease Mother     Hypertension Mother     Heart Disease Father          \"Heart failure\" age 76    Hypertension Father     Bleeding Prob Sister         AFTER CHILDBIRTH-BLEEDING    Liver Disease Brother          cirrhosis (ETOH)    Alcohol abuse Brother     Heart Disease Sister         HEART FAILURE    Arthritis-osteo Sister         Current Facility-Administered Medications   Medication Dose Route Frequency    sodium chloride (NS) flush 5-40 mL  5-40 mL IntraVENous Q8H    cefTRIAXone (ROCEPHIN) 1 g in 0.9% sodium chloride (MBP/ADV) 50 mL  1 g IntraVENous Q24H    amLODIPine (NORVASC) tablet 5 mg  5 mg Oral DAILY    ezetimibe (ZETIA) tablet 10 mg  10 mg Oral DAILY    metoprolol tartrate (LOPRESSOR) tablet 25 mg  25 mg Oral Q12H    losartan (COZAAR) tablet 50 mg  50 mg Oral DAILY    albuterol-ipratropium (DUO-NEB) 2.5 MG-0.5 MG/3 ML  3 mL Nebulization QID RT    predniSONE (DELTASONE) tablet 40 mg  40 mg Oral DAILY WITH BREAKFAST    azithromycin (ZITHROMAX) 500 mg in 0.9% sodium chloride (MBP/ADV) 250 mL  500 mg IntraVENous Q24H       Review of Systems:  A comprehensive review of systems was negative except for that written in the HPI. Objective:   Vital Signs:    Visit Vitals  /88   Pulse 67   Temp 98.4 °F (36.9 °C)   Resp 14   Ht 5' 11\" (1.803 m)   Wt 123.8 kg (272 lb 14.9 oz)   SpO2 97%   BMI 38.07 kg/m²       O2 Device: Room air       Temp (24hrs), Av.5 °F (36.9 °C), Min:97.7 °F (36.5 °C), Max:99.3 °F (37.4 °C)       Intake/Output:   Last shift:      No intake/output data recorded. Last 3 shifts:  1901 - 07/10 0700  In: 600 [P.O.:600]  Out: 500 [Urine:500]    Intake/Output Summary (Last 24 hours) at 7/10/2019 0853  Last data filed at 7/10/2019 0200  Gross per 24 hour   Intake 600 ml   Output 500 ml   Net 100 ml      Physical Exam:   General:  Alert, cooperative, no distress, appears stated age. Head:  Normocephalic, without obvious abnormality, atraumatic. Eyes:  Conjunctivae/corneas clear. Nose: Nares normal. Septum midline. Mucosa normal.     Throat: Lips, mucosa, and tongue normal. Teeth and gums normal.   Neck: Supple, symmetrical, trachea midline    Back:   Symmetric, no curvature. ROM normal.   Lungs:   CTA B   Chest wall:  No tenderness or deformity.    Heart:  Regular rate and rhythm, +murmur, +edema   Abdomen:   Soft, non-tender. Bowel sounds normal.     Extremities: Extremities normal, atraumatic, no cyanosis or clubbing   Skin: Skin color, texture, turgor normal. No rashes or lesions   Neurologic: Grossly nonfocal     Data:   Labs:  Recent Labs     07/10/19  0257 07/09/19  1012   WBC 8.0 6.1   HGB 14.3 15.3   HCT 42.0 45.4    172     Recent Labs     07/10/19  0257 07/09/19  1322 07/09/19  1012     --  140   K 3.2*  --  3.2*     --  102   CO2 30  --  36*   *  --  94   BUN 13  --  14   CREA 1.31*  --  1.36*   CA 9.5  --  9.4   MG  --   --  2.2   ALB  --   --  4.0   TBILI  --   --  1.1*   SGOT  --   --  32   ALT  --   --  48   INR  --  1.0  --      No results for input(s): PHI, PCO2I, PO2I, HCO3I, FIO2I in the last 72 hours.     Imaging:  I have personally reviewed the patients radiographs:  None today        Jose Vásquez MD

## 2019-07-10 NOTE — PHYSICIAN ADVISORY
Short Stay Review Pt Name:  Miranda Desir MR#  [id number] CSN#   [id number] Room and Hospital  2251/01  @ [facility] Hospitalization date  7/9/2019 11:15 AM 
No discharge date for patient encounter. Current Attending Physician  Elizabet Mishra MD  
 
A discharge order has been placed for this episode of hospital care for Mr. Miranda Desir; since this hospital stay is less than two midnights, I reviewed Mr. Zerita Coast Pompey's chart. Mr. Zerita Coast Pompey's healthcare insurance/benefit include: 
Payor: [place] MED[place]ARE / Plan: VA MEDICARE PART A & B / Product Type: Medicare /  
 
Utilization Review related case summary:  
Age  79 y.o.  
BMI  Body mass index is 38.07 kg/m². PMHx includes Hospital course  The pt was hospitalized for acute hemoptysis initially suspected due to pneumonia while other workup were initiated. He was started on broad spectrum abx and pulmonologist were called in. Risk of deterioration at the time this patient  was hospitalized     Moderate On the basis of chart review, this patient's hospitalization status     
is appropriate for INPATIENT Kirt Bedoya MD MPH FACP Cell : [phone] Physician Advisor Lizette [phone]  
Utilization Review, Care Management CSN:  [id number] SALVADOR:   [id number] Admitted on :  7/9/2019 Discharge order

## 2019-07-10 NOTE — PROGRESS NOTES
Pharmacy Clarification of the Prior to Admission Medication Regimen Retrospective to the Admission Medication Reconciliation    The patient was interviewed regarding clarification of the prior to admission medication regimen as he was being discharged. Patient was questioned regarding use of any other inhalers, topical products, over the counter medications, herbal medications, vitamin products or ophthalmic/nasal/otic medication use. Information Obtained From: RX Query, Patient, RX Bottles    Recommendations/Findings: The following amendments were made to the patient's active medication list on file at [address]:     1) Additions: None    2) Removals: None    3) Changes:  aspirin delayed-release 81 mg tablet (Old regimen: no sig /New regimen: 1 tab daily)  folic acid/multivit-min/lutein (CENTRUM SILVER PO) (Old regimen: no sig /New regimen: 1 tab daily)  losartan (COZAAR) 100 mg tablet (Old regimen: 50 mg daily /New regimen: 100 mg daily)  metoprolol tartrate (LOPRESSOR) tablet (Old regimen: (strength 25 mg) 25 mg BID /New regimen: (strength 50 mg) 50 mg BID)    4) Pertinent Pharmacy Findings:  During the interview patient was getting belongings together to be discharged and stated that the doctor told him to stop the Aspirin and Sildenafil. PTA medication list was corrected to the following:     Prior to Admission Medications   Prescriptions Last Dose Informant Patient Reported? Taking? amLODIPine (NORVASC) 2.5 mg tablet 7/9/2019 at Unknown time Self Yes Yes   Sig: Take 2.5 mg by mouth daily. aspirin delayed-release 81 mg tablet 7/8/2019 at Unknown time  Yes Yes   Sig: Take 1 tab by mouth daily. ezetimibe (ZETIA) 10 mg tablet 7/9/2019 at Unknown time Self Yes Yes   Sig: Take 10 mg by mouth daily. folic acid/multivit-min/lutein (CENTRUM SILVER PO) 7/8/2019 at Unknown time Self Yes Yes   Sig: Take 1 Tab by mouth daily.    hydroCHLOROthiazide (HYDRODIURIL) 25 mg tablet 7/9/2019 at Unknown time Self Yes Yes   Sig: Take 25 mg by mouth daily. losartan (COZAAR) 100 mg tablet 7/9/2019 at Unknown time Self Yes Yes   Sig: Take 100 mg by mouth daily. metoprolol tartrate (LOPRESSOR) 50 mg tablet 7/9/2019 at Unknown time Self Yes Yes   Sig: Take 50 mg by mouth two (2) times a day. sildenafil, antihypertensive, (REVATIO) 20 mg tablet 7/8/2019 at Unknown time Self Yes Yes   Sig: Take 20 mg by mouth as needed.       Facility-Administered Medications: None          Thank you,  Marielle Madison  Medication History Pharmacy Technician

## 2019-07-11 ENCOUNTER — PATIENT OUTREACH (OUTPATIENT)
Dept: CASE MANAGEMENT | Age: 69
End: 2019-07-11

## 2019-07-11 NOTE — PROGRESS NOTES
Hospital Discharge Follow-Up      Date/Time:  2019 11:31 AM    Patient was admitted to [facility] on 2019 and discharged on 7/10/2019 for Rt upper PNA vs Pulmonary hemorrhage . The physician discharge summary was available at the time of outreach. Patient was contacted within 1 business days of discharge. Top Challenges reviewed with the provider   Patient will need follow up BMP in one week         Method of communication with provider :phone    Inpatient RRAT score: not listed   Was this a readmission? no   Patient stated reason for the readmission: n/a    Nurse Navigator (NN) contacted the patient's wife Danie Lemus by telephone to perform post hospital discharge assessment. Verified name and  with family as identifiers. Provided introduction to self, and explanation of the Nurse Navigator role. Reviewed discharge instructions and red flags with family who verbalized understanding. Family given an opportunity to ask questions and does not have any further questions or concerns at this time. The family agrees to contact the PCP office for questions related to their healthcare. NN provided contact information for future reference. Disease Specific:   N/A    Summary of patient's top problems:  1. Patient will need to have follow up BMP (provider's office notified)    2.  Will need to scheduled follow up appointment with PCP       Home Health orders at discharge: [address] [place] [address] 114: [facility]   Date of initial visit: TBD    Durable Medical Equipment ordered/company: N/A  Durable Medical Equipment received: N/A    Barriers to care? none reported    Advance Care Planning:   Does patient have an Advance Directive:  reviewed and current     Medication(s):   New Medications at Discharge: Azithromycin, Cefdinir, Prednisone   Changed Medications at Discharge: Aspirin held   Discontinued Medications at Discharge: Sildenafil     Medication reconciliation was performed with family, who verbalizes understanding of administration of home medications. There were no barriers to obtaining medications identified at this time. Referral to Pharm D needed: no     Current Outpatient Medications   Medication Sig    aspirin delayed-release 81 mg tablet We are holding until hemoptysis get resolves, resume week after blood in sputum gets resolved    azithromycin (ZITHROMAX) 500 mg tab Take 1 Tab by mouth daily for 5 days.  cefdinir (OMNICEF) 300 mg capsule Take 1 Cap by mouth two (2) times a day for 5 days.  predniSONE (DELTASONE) 20 mg tablet 5 tabs daily for 3 days    losartan (COZAAR) 100 mg tablet Take 100 mg by mouth daily.  ezetimibe (ZETIA) 10 mg tablet Take 10 mg by mouth daily.  amLODIPine (NORVASC) 2.5 mg tablet Take 2.5 mg by mouth daily.  folic acid/multivit-min/lutein (CENTRUM SILVER PO) Take 1 Tab by mouth daily.  hydroCHLOROthiazide (HYDRODIURIL) 25 mg tablet Take 25 mg by mouth daily.  metoprolol tartrate (LOPRESSOR) 50 mg tablet Take 50 mg by mouth two (2) times a day. No current facility-administered medications for this visit. [facility] follow up appointment(s): No future appointments.    Non-[facility] follow up appointment(s): Dr. Corina Sterling  (pulmonologist) 7/24/2019 at 9:45 am. Patient is to schedule follow up appointment with PCP in one week   [facility]:  scheduled  7/12/2019

## 2019-07-13 ENCOUNTER — HOME CARE VISIT (OUTPATIENT)
Dept: HOME HEALTH SERVICES | Facility: HOME HEALTH | Age: 69
End: 2019-07-13

## 2019-07-13 LAB
M TB IFN-G BLD-IMP: NEGATIVE
QUANTIFERON CRITERIA, QFI1T: NORMAL
QUANTIFERON MITOGEN VALUE: >10 IU/ML
QUANTIFERON NIL VALUE: 0.02 IU/ML
QUANTIFERON TB1 AG: 0.03 IU/ML
QUANTIFERON TB2 AG: 0.02 IU/ML

## 2019-07-17 ENCOUNTER — PATIENT OUTREACH (OUTPATIENT)
Dept: CASE MANAGEMENT | Age: 69
End: 2019-07-17

## 2019-07-17 ENCOUNTER — HOME CARE VISIT (OUTPATIENT)
Dept: SCHEDULING | Facility: HOME HEALTH | Age: 69
End: 2019-07-17

## 2019-07-17 PROCEDURE — G0299 HHS/HOSPICE OF RN EA 15 MIN: HCPCS

## 2019-07-17 NOTE — PROGRESS NOTES
Outgoing call placed to patient's PCP office spoke to Timothy () introduced self  and reason for the call given. Patient identified utilizing 2 identifiers. Timothy confirmed patient attended hospital follow up appointment with Dr. Ana Maria Bravo.

## 2019-09-11 ENCOUNTER — HOSPITAL ENCOUNTER (OUTPATIENT)
Dept: CT IMAGING | Age: 69
Discharge: HOME | End: 2019-09-11
Attending: NURSE PRACTITIONER
Payer: MEDICARE

## 2019-09-11 DIAGNOSIS — J18.9 PNA (PNEUMONIA): ICD-10-CM

## 2019-09-11 PROCEDURE — 71250 CT THORAX DX C-: CPT

## 2019-10-29 ENCOUNTER — HOSPITAL ENCOUNTER (EMERGENCY)
Age: 69
Discharge: HOME | End: 2019-10-30
Attending: EMERGENCY MEDICINE
Payer: MEDICARE

## 2019-10-29 DIAGNOSIS — N17.9 AKI (ACUTE KIDNEY INJURY) (HCC): ICD-10-CM

## 2019-10-29 DIAGNOSIS — L03.116 CELLULITIS OF LEFT LOWER EXTREMITY: ICD-10-CM

## 2019-10-29 DIAGNOSIS — M79.89 LEFT LEG SWELLING: Primary | ICD-10-CM

## 2019-10-29 LAB
ALBUMIN SERPL-MCNC: 3.9 G/DL (ref 3.5–5)
ALBUMIN/GLOB SERPL: 1.1 {RATIO} (ref 1.1–2.2)
ALP SERPL-CCNC: 69 U/L (ref 45–117)
ALT SERPL-CCNC: 48 U/L (ref 12–78)
ANION GAP SERPL CALC-SCNC: 5 MMOL/L (ref 5–15)
AST SERPL-CCNC: 29 U/L (ref 15–37)
BASOPHILS # BLD: 0.1 K/UL (ref 0–0.1)
BASOPHILS NFR BLD: 1 % (ref 0–1)
BILIRUB SERPL-MCNC: 0.6 MG/DL (ref 0.2–1)
BUN SERPL-MCNC: 21 MG/DL (ref 6–20)
BUN/CREAT SERPL: 9 (ref 12–20)
CALCIUM SERPL-MCNC: 8.8 MG/DL (ref 8.5–10.1)
CHLORIDE SERPL-SCNC: 103 MMOL/L (ref 97–108)
CO2 SERPL-SCNC: 32 MMOL/L (ref 21–32)
CREAT SERPL-MCNC: 2.34 MG/DL (ref 0.7–1.3)
DIFFERENTIAL METHOD BLD: ABNORMAL
EOSINOPHIL # BLD: 0.5 K/UL (ref 0–0.4)
EOSINOPHIL NFR BLD: 6 % (ref 0–7)
ERYTHROCYTE [DISTWIDTH] IN BLOOD BY AUTOMATED COUNT: 13.4 % (ref 11.5–14.5)
GLOBULIN SER CALC-MCNC: 3.7 G/DL (ref 2–4)
GLUCOSE SERPL-MCNC: 103 MG/DL (ref 65–100)
HCT VFR BLD AUTO: 41.8 % (ref 36.6–50.3)
HGB BLD-MCNC: 13.9 G/DL (ref 12.1–17)
IMM GRANULOCYTES # BLD AUTO: 0 K/UL (ref 0–0.04)
IMM GRANULOCYTES NFR BLD AUTO: 0 % (ref 0–0.5)
LYMPHOCYTES # BLD: 2.7 K/UL (ref 0.8–3.5)
LYMPHOCYTES NFR BLD: 33 % (ref 12–49)
MCH RBC QN AUTO: 29.2 PG (ref 26–34)
MCHC RBC AUTO-ENTMCNC: 33.3 G/DL (ref 30–36.5)
MCV RBC AUTO: 87.8 FL (ref 80–99)
MONOCYTES # BLD: 0.5 K/UL (ref 0–1)
MONOCYTES NFR BLD: 7 % (ref 5–13)
NEUTS SEG # BLD: 4.3 K/UL (ref 1.8–8)
NEUTS SEG NFR BLD: 53 % (ref 32–75)
NRBC # BLD: 0 K/UL (ref 0–0.01)
NRBC BLD-RTO: 0 PER 100 WBC
PLATELET # BLD AUTO: 196 K/UL (ref 150–400)
PMV BLD AUTO: 11.8 FL (ref 8.9–12.9)
POTASSIUM SERPL-SCNC: 3.7 MMOL/L (ref 3.5–5.1)
PROT SERPL-MCNC: 7.6 G/DL (ref 6.4–8.2)
RBC # BLD AUTO: 4.76 M/UL (ref 4.1–5.7)
SODIUM SERPL-SCNC: 140 MMOL/L (ref 136–145)
WBC # BLD AUTO: 8.2 K/UL (ref 4.1–11.1)

## 2019-10-29 PROCEDURE — 99283 EMERGENCY DEPT VISIT LOW MDM: CPT

## 2019-10-29 PROCEDURE — 80053 COMPREHEN METABOLIC PANEL: CPT

## 2019-10-29 PROCEDURE — 36415 COLL VENOUS BLD VENIPUNCTURE: CPT

## 2019-10-29 PROCEDURE — 85025 COMPLETE CBC W/AUTO DIFF WBC: CPT

## 2019-10-30 ENCOUNTER — APPOINTMENT (OUTPATIENT)
Dept: ULTRASOUND IMAGING | Age: 69
End: 2019-10-30
Attending: EMERGENCY MEDICINE
Payer: MEDICARE

## 2019-10-30 VITALS
OXYGEN SATURATION: 99 % | RESPIRATION RATE: 16 BRPM | HEART RATE: 66 BPM | DIASTOLIC BLOOD PRESSURE: 79 MMHG | BODY MASS INDEX: 37.62 KG/M2 | HEIGHT: 71 IN | TEMPERATURE: 98.7 F | WEIGHT: 268.74 LBS | SYSTOLIC BLOOD PRESSURE: 172 MMHG

## 2019-10-30 PROCEDURE — 74011250636 HC RX REV CODE- 250/636: Performed by: EMERGENCY MEDICINE

## 2019-10-30 PROCEDURE — 93970 EXTREMITY STUDY: CPT

## 2019-10-30 PROCEDURE — 74011250637 HC RX REV CODE- 250/637: Performed by: EMERGENCY MEDICINE

## 2019-10-30 RX ORDER — HYDROCODONE BITARTRATE AND ACETAMINOPHEN 5; 325 MG/1; MG/1
1 TABLET ORAL
Qty: 12 TAB | Refills: 0 | Status: SHIPPED | OUTPATIENT
Start: 2019-10-30 | End: 2019-11-02

## 2019-10-30 RX ORDER — DOXYCYCLINE HYCLATE 100 MG
100 TABLET ORAL 2 TIMES DAILY
Qty: 14 TAB | Refills: 0 | Status: SHIPPED | OUTPATIENT
Start: 2019-10-30 | End: 2019-11-06

## 2019-10-30 RX ORDER — HYDROCODONE BITARTRATE AND ACETAMINOPHEN 5; 325 MG/1; MG/1
1 TABLET ORAL
Status: COMPLETED | OUTPATIENT
Start: 2019-10-30 | End: 2019-10-30

## 2019-10-30 RX ADMIN — SODIUM CHLORIDE 500 ML: 900 INJECTION, SOLUTION INTRAVENOUS at 00:35

## 2019-10-30 RX ADMIN — HYDROCODONE BITARTRATE AND ACETAMINOPHEN 1 TABLET: 5; 325 TABLET ORAL at 00:35

## 2019-10-30 NOTE — ED PROVIDER NOTES
EMERGENCY DEPARTMENT HISTORY AND PHYSICAL EXAM      Date: 10/29/2019  Patient Name: Angela Vargas    History of Presenting Illness     Chief Complaint   Patient presents with    Skin Infection     pt reports that he was seen by PCP this afternoon for cellulitis to L foot - was placed on Bactrim and Keflex Friday, as well as venous stasis dermatitis; states that sx's got worse once he got home this afternoon       History Provided By: Patient and Patient's Wife    HPI: Angela Vargas, 81 y.o. male with PMHx significant for renal insufficiency, hypertension, congestive heart failure, high cholesterol presents to the emergency room with chief complaint of bilateral lower extremity swelling and redness of his left foot and leg. Patient states he has venous stasis dermatitis. He was seen by his PCP for cellulitis this afternoon and had been started on Bactrim and Keflex 2 days ago. His  swelling has improved since starting the antibiotics, but his PCP referred him to a podiatrist when she saw him yesterday. He denies fevers. He reports that his legs are always swollen but a little worse today. He thinks his symptoms initially started on Friday when his puppy nipped his foot and then licked the wound. Patient reports that he has had sharp pains in the bottom of his left foot where his skin is cracking. PCP: Augusitne Chow MD    No current facility-administered medications on file prior to encounter. Current Outpatient Medications on File Prior to Encounter   Medication Sig Dispense Refill    aspirin delayed-release 81 mg tablet We are holding until hemoptysis get resolves, resume week after blood in sputum gets resolved 30 Tab 0    predniSONE (DELTASONE) 20 mg tablet 5 tabs daily for 3 days 15 Tab 0    losartan (COZAAR) 100 mg tablet Take 100 mg by mouth daily.  ezetimibe (ZETIA) 10 mg tablet Take 10 mg by mouth daily.  amLODIPine (NORVASC) 2.5 mg tablet Take 2.5 mg by mouth daily.       folic acid/multivit-min/lutein (CENTRUM SILVER PO) Take 1 Tab by mouth daily.  hydroCHLOROthiazide (HYDRODIURIL) 25 mg tablet Take 25 mg by mouth daily.  metoprolol tartrate (LOPRESSOR) 50 mg tablet Take 50 mg by mouth two (2) times a day. Past History     Past Medical History:  Past Medical History:   Diagnosis Date    Arrhythmia     enlarged heart, murmur    Arthritis     HTN (hypertension)     Hypercholesterolemia     Low serum testosterone level     Sleep apnea     wears CPAP at night, does not wear regularly       Past Surgical History:  Past Surgical History:   Procedure Laterality Date    COLONOSCOPY N/A 2018    COLONOSCOPY performed by Arianna Lee MD at [place] [id number] N/A 2019    FLEXIBLE SIGMOIDOSCOPY (PARTIAL) performed by Ab Toscano MD at [facility] ENDOSCOPY    HX APPENDECTOMY      HX GI      COLONOSCOPY    HX HEENT      LASIK    HX HERNIA REPAIR      MCV    HX ORTHOPAEDIC Right 2012    [place], [place].(Dr. Larisa Keys) ROTATOR CUFF    HX TONSILLECTOMY         Family History:  Family History   Problem Relation Age of Onset    Heart Disease Mother     Hypertension Mother     Heart Disease Father          \"Heart failure\" age 76    Hypertension Father     Bleeding Prob Sister         AFTER CHILDBIRTH-BLEEDING    Liver Disease Brother          cirrhosis (ETOH)    Alcohol abuse Brother     Heart Disease Sister         HEART FAILURE    Arthritis-osteo Sister        Social History:  Social History     Tobacco Use    Smoking status: Former Smoker     Packs/day: 0.50     Last attempt to quit:      Years since quittin.8    Smokeless tobacco: Never Used    Tobacco comment: CIGARS   Substance Use Topics    Alcohol use: No     Frequency: Never    Drug use: No       Allergies:   Allergies   Allergen Reactions    Ace Inhibitors Angioedema     Lip swelling with losartan    Shrimp Hives    Statins-Hmg-Coa Reductase Inhibitors Myalgia         Review of Systems   Review of Systems   Constitutional: Negative for chills and fever. HENT: Negative for congestion, ear pain, rhinorrhea and sore throat. Eyes: Negative. Respiratory: Negative for cough, chest tightness and shortness of breath. Cardiovascular: Positive for leg swelling. Negative for chest pain and palpitations. Gastrointestinal: Negative for abdominal pain, nausea and vomiting. Genitourinary: Negative for dysuria, flank pain and hematuria. Musculoskeletal: Negative for back pain, gait problem, myalgias and neck pain. Skin: Positive for rash and wound. Neurological: Negative for dizziness, weakness, light-headedness and headaches. Psychiatric/Behavioral: Negative for confusion. The patient is not nervous/anxious.           Physical Exam   General appearance - well nourished, well appearing, and in no distress  Eyes - pupils equal and reactive, extraocular eye movements intact  ENT - mucous membranes moist, pharynx normal without lesions  Neck - supple, no significant adenopathy; non-tender to palpation  Chest - clear to auscultation, no wheezes, rales or rhonchi; non-tender to palpation  Heart - normal rate and regular rhythm, S1 and S2 normal, no murmurs noted  Abdomen - soft, nontender, nondistended, no masses or organomegaly  Musculoskeletal - no joint tenderness, deformity or swelling; normal ROM  Extremities - peripheral pulses normal, 2+ pitting edema bilateral lower extremities with mild erythema anterior left lower leg   skin - normal coloration and turgor, no rashes, crusty, dry skin with some cracks on the plantar aspect of left foot  Neurological - alert, oriented x3, normal speech, no focal findings or movement disorder noted    Diagnostic Study Results     Labs -     Recent Results (from the past 12 hour(s))   CBC WITH AUTOMATED DIFF    Collection Time: 10/29/19  9:01 PM   Result Value Ref Range    WBC 8.2 4.1 - 11.1 K/uL    RBC 4. 76 4.10 - 5.70 M/uL    HGB 13.9 12.1 - 17.0 g/dL    HCT 41.8 36.6 - 50.3 %    MCV 87.8 80.0 - 99.0 FL    MCH 29.2 26.0 - 34.0 PG    MCHC 33.3 30.0 - 36.5 g/dL    RDW 13.4 11.5 - 14.5 %    PLATELET 469 486 - 119 K/uL    MPV 11.8 8.9 - 12.9 FL    NRBC 0.0 0  WBC    ABSOLUTE NRBC 0.00 0.00 - 0.01 K/uL    NEUTROPHILS 53 32 - 75 %    LYMPHOCYTES 33 12 - 49 %    MONOCYTES 7 5 - 13 %    EOSINOPHILS 6 0 - 7 %    BASOPHILS 1 0 - 1 %    IMMATURE GRANULOCYTES 0 0.0 - 0.5 %    ABS. NEUTROPHILS 4.3 1.8 - 8.0 K/UL    ABS. LYMPHOCYTES 2.7 0.8 - 3.5 K/UL    ABS. MONOCYTES 0.5 0.0 - 1.0 K/UL    ABS. EOSINOPHILS 0.5 (H) 0.0 - 0.4 K/UL    ABS. BASOPHILS 0.1 0.0 - 0.1 K/UL    ABS. IMM. GRANS. 0.0 0.00 - 0.04 K/UL    DF AUTOMATED     METABOLIC PANEL, COMPREHENSIVE    Collection Time: 10/29/19  9:01 PM   Result Value Ref Range    Sodium 140 136 - 145 mmol/L    Potassium 3.7 3.5 - 5.1 mmol/L    Chloride 103 97 - 108 mmol/L    CO2 32 21 - 32 mmol/L    Anion gap 5 5 - 15 mmol/L    Glucose 103 (H) 65 - 100 mg/dL    BUN 21 (H) 6 - 20 MG/DL    Creatinine 2.34 (H) 0.70 - 1.30 MG/DL    BUN/Creatinine ratio 9 (L) 12 - 20      GFR est AA 34 (L) >60 ml/min/1.73m2    GFR est non-AA 28 (L) >60 ml/min/1.73m2    Calcium 8.8 8.5 - 10.1 MG/DL    Bilirubin, total 0.6 0.2 - 1.0 MG/DL    ALT (SGPT) 48 12 - 78 U/L    AST (SGOT) 29 15 - 37 U/L    Alk. phosphatase 69 45 - 117 U/L    Protein, total 7.6 6.4 - 8.2 g/dL    Albumin 3.9 3.5 - 5.0 g/dL    Globulin 3.7 2.0 - 4.0 g/dL    A-G Ratio 1.1 1.1 - 2.2         Radiologic Studies -   No orders to display     CT Results  (Last 48 hours)    None        CXR Results  (Last 48 hours)    None            Medical Decision Making   I am the first provider for this patient. I reviewed the vital signs, available nursing notes, past medical history, past surgical history, family history and social history. Vital Signs-Reviewed the patient's vital signs.   Patient Vitals for the past 12 hrs:   Temp Pulse Resp BP SpO2   10/29/19 2052 98.7 °F (37.1 °C) 72 16 172/79 97 %           Records Reviewed: Nursing Notes and Old Medical Records    Provider Notes (Medical Decision Making):   Differential diagnosis: Cellulitis, fungal infection, DVT, renal insufficiency    ED Course:   Initial assessment performed. The patients presenting problems have been discussed, and they are in agreement with the care plan formulated and outlined with them. I have encouraged them to ask questions as they arise throughout their visit. Progress Notes:   Ultrasounds are negative for DVT. Patient has elevated creatinine, but I believe this may be due to the Bactrim that he was started on a few days ago. Will have patient stop the Bactrim and switch to doxycycline. Disposition:  Discharge home. Follow-up with PCP in 2 to 3 days    PLAN:  1. Discharge Medication List as of 10/30/2019  3:15 AM      START taking these medications    Details   HYDROcodone-acetaminophen (NORCO) 5-325 mg per tablet Take 1 Tab by mouth every six (6) hours as needed for Pain for up to 3 days. Max Daily Amount: 4 Tabs., Print, Disp-12 Tab, R-0      doxycycline (VIBRA-TABS) 100 mg tablet Take 1 Tab by mouth two (2) times a day for 7 days. , Print, Disp-14 Tab, R-0         CONTINUE these medications which have NOT CHANGED    Details   aspirin delayed-release 81 mg tablet We are holding until hemoptysis get resolves, resume week after blood in sputum gets resolved, No Print, Disp-30 Tab, R-0      predniSONE (DELTASONE) 20 mg tablet 5 tabs daily for 3 days, Print, Disp-15 Tab, R-0      losartan (COZAAR) 100 mg tablet Take 100 mg by mouth daily. , Historical Med      ezetimibe (ZETIA) 10 mg tablet Take 10 mg by mouth daily. , Historical Med      amLODIPine (NORVASC) 2.5 mg tablet Take 2.5 mg by mouth daily. , Historical Med      folic acid/multivit-min/lutein (CENTRUM SILVER PO) Take 1 Tab by mouth daily. , Historical Med      hydroCHLOROthiazide (HYDRODIURIL) 25 mg tablet Take 25 mg by mouth daily. , Historical Med      metoprolol tartrate (LOPRESSOR) 50 mg tablet Take 50 mg by mouth two (2) times a day., Historical Med           2. Follow-up Information    None       Return to ED if worse     Diagnosis     Clinical Impression: No diagnosis found.

## 2019-10-30 NOTE — DISCHARGE INSTRUCTIONS
Patient Education        Cellulitis: Care Instructions  Your Care Instructions    Cellulitis is a skin infection caused by bacteria, most often strep or staph. It often occurs after a break in the skin from a scrape, cut, bite, or puncture, or after a rash. Cellulitis may be treated without doing tests to find out what caused it. But your doctor may do tests, if needed, to look for a specific bacteria, like methicillin-resistant Staphylococcus aureus (MRSA). The doctor has checked you carefully, but problems can develop later. If you notice any problems or new symptoms, get medical treatment right away. Follow-up care is a key part of your treatment and safety. Be sure to make and go to all appointments, and call your doctor if you are having problems. It's also a good idea to know your test results and keep a list of the medicines you take. How can you care for yourself at home? · Take your antibiotics as directed. Do not stop taking them just because you feel better. You need to take the full course of antibiotics. · Prop up the infected area on pillows to reduce pain and swelling. Try to keep the area above the level of your heart as often as you can. · If your doctor told you how to care for your wound, follow your doctor's instructions. If you did not get instructions, follow this general advice:  ? Wash the wound with clean water 2 times a day. Don't use hydrogen peroxide or alcohol, which can slow healing. ? You may cover the wound with a thin layer of petroleum jelly, such as Vaseline, and a nonstick bandage. ? Apply more petroleum jelly and replace the bandage as needed. · Be safe with medicines. Take pain medicines exactly as directed. ? If the doctor gave you a prescription medicine for pain, take it as prescribed. ? If you are not taking a prescription pain medicine, ask your doctor if you can take an over-the-counter medicine.   To prevent cellulitis in the future  · Try to prevent cuts, scrapes, or other injuries to your skin. Cellulitis most often occurs where there is a break in the skin. · If you get a scrape, cut, mild burn, or bite, wash the wound with clean water as soon as you can to help avoid infection. Don't use hydrogen peroxide or alcohol, which can slow healing. · If you have swelling in your legs (edema), support stockings and good skin care may help prevent leg sores and cellulitis. · Take care of your feet, especially if you have diabetes or other conditions that increase the risk of infection. Wear shoes and socks. Do not go barefoot. If you have athlete's foot or other skin problems on your feet, talk to your doctor about how to treat them. When should you call for help? Call your doctor now or seek immediate medical care if:    · You have signs that your infection is getting worse, such as:  ? Increased pain, swelling, warmth, or redness. ? Red streaks leading from the area. ? Pus draining from the area. ? A fever.     · You get a rash.    Watch closely for changes in your health, and be sure to contact your doctor if:    · You do not get better as expected. Where can you learn more? Go to http://sarmad-alessandra.info/. Jeannie Leak in the search box to learn more about \"Cellulitis: Care Instructions. \"  Current as of: April 1, 2019  Content Version: 12.2  © 9479-0383 [employer]. Care instructions adapted under license by [employer] (which disclaims liability or warranty for this information). If you have questions about a medical condition or this instruction, always ask your healthcare professional. [place] [address] any warranty or liability for your use of this information. Patient Education        Acute Kidney Injury: Care Instructions  Your Care Instructions    Acute kidney injury (NAYELI) is a sudden decrease in kidney function. This can happen over a period of hours, days or, in some cases, weeks.  NAYELI used to be called acute renal failure, but kidney failure doesn't always happen with NAYELI. Common causes of NAYELI are dehydration, blood loss, and medicines. When NAYELI happens, the kidneys have trouble removing waste and excess fluids from the body. The waste and fluids build up and become harmful. Kidney function may return to normal if the cause of NAYELI is treated quickly. Your chance of a full recovery depends on what caused the problem, how quickly the cause was treated, and what other medical problems you have. A machine may be used to help your kidneys remove waste and fluids for a short period of time. This is called dialysis. Follow-up care is a key part of your treatment and safety. Be sure to make and go to all appointments, and call your doctor if you are having problems. It's also a good idea to know your test results and keep a list of the medicines you take. How can you care for yourself at home? · Talk to your doctor about how much fluid you should drink. · Eat a balanced diet. Talk to your doctor or a dietitian about what type of diet may be best for you. You may need to limit sodium, potassium, and phosphorus. · If you need dialysis, follow the instructions and schedule for dialysis that your doctor gives you. · Do not smoke. Smoking can make your condition worse. If you need help quitting, talk to your doctor about stop-smoking programs and medicines. These can increase your chances of quitting for good. · Do not drink alcohol. · Review all of your medicines with your doctor. Do not take any medicines, including nonsteroidal anti-inflammatory drugs (NSAIDs) such as ibuprofen (Advil, Motrin) or naproxen (Aleve), unless your doctor says it is safe for you to do so. · Make sure that anyone treating you for any health problem knows that you have had NAYELI. When should you call for help? Call 911 anytime you think you may need emergency care.  For example, call if:    · You passed out (lost consciousness).    Call your doctor now or seek immediate medical care if:    · You have new or worse nausea and vomiting.     · You have much less urine than normal, or you have no urine.     · You are feeling confused or cannot think clearly.     · You have new or more blood in your urine.     · You have new swelling.     · You are dizzy or lightheaded, or feel like you may faint.    Watch closely for changes in your health, and be sure to contact your doctor if:    · You do not get better as expected. Where can you learn more? Go to http://sarmad-alessandra.info/. Enter M043 in the search box to learn more about \"Acute Kidney Injury: Care Instructions. \"  Current as of: October 31, 2018  Content Version: 12.2  © 9961-0332 [employer]. Care instructions adapted under license by [employer] (which disclaims liability or warranty for this information). If you have questions about a medical condition or this instruction, always ask your healthcare professional. [place] [address] any warranty or liability for your use of this information.

## 2019-12-17 ENCOUNTER — HOSPITAL ENCOUNTER (OUTPATIENT)
Dept: CT IMAGING | Age: 69
Discharge: HOME | End: 2019-12-17
Attending: NURSE PRACTITIONER
Payer: MEDICARE

## 2019-12-17 DIAGNOSIS — R91.1 PULMONARY NODULE: ICD-10-CM

## 2019-12-17 PROCEDURE — 71250 CT THORAX DX C-: CPT

## 2020-01-14 ENCOUNTER — HOSPITAL ENCOUNTER (EMERGENCY)
Age: 70
Discharge: HOME | End: 2020-01-14
Attending: EMERGENCY MEDICINE
Payer: MEDICARE

## 2020-01-14 VITALS
BODY MASS INDEX: 37.13 KG/M2 | SYSTOLIC BLOOD PRESSURE: 157 MMHG | HEIGHT: 71 IN | TEMPERATURE: 98.3 F | DIASTOLIC BLOOD PRESSURE: 93 MMHG | OXYGEN SATURATION: 100 % | WEIGHT: 265.21 LBS | HEART RATE: 65 BPM | RESPIRATION RATE: 18 BRPM

## 2020-01-14 DIAGNOSIS — M54.50 LEFT-SIDED LOW BACK PAIN WITHOUT SCIATICA, UNSPECIFIED CHRONICITY: ICD-10-CM

## 2020-01-14 DIAGNOSIS — H60.501 ACUTE OTITIS EXTERNA OF RIGHT EAR, UNSPECIFIED TYPE: Primary | ICD-10-CM

## 2020-01-14 LAB
ANION GAP BLD CALC-SCNC: 14 MMOL/L (ref 10–20)
APPEARANCE UR: CLEAR
BACTERIA URNS QL MICRO: NEGATIVE /HPF
BILIRUB UR QL: NEGATIVE
BUN BLD-MCNC: 15 MG/DL (ref 9–20)
CA-I BLD-MCNC: 1.2 MMOL/L (ref 1.12–1.32)
CHLORIDE BLD-SCNC: 99 MMOL/L (ref 98–107)
CO2 BLD-SCNC: 31 MMOL/L (ref 21–32)
COLOR UR: ABNORMAL
CREAT BLD-MCNC: 1.4 MG/DL (ref 0.6–1.3)
EPITH CASTS URNS QL MICRO: ABNORMAL /LPF
GLUCOSE BLD-MCNC: 91 MG/DL (ref 65–100)
GLUCOSE UR STRIP.AUTO-MCNC: NEGATIVE MG/DL
HCT VFR BLD CALC: 43 % (ref 36.6–50.3)
HGB UR QL STRIP: NEGATIVE
HYALINE CASTS URNS QL MICRO: ABNORMAL /LPF (ref 0–5)
KETONES UR QL STRIP.AUTO: NEGATIVE MG/DL
LEUKOCYTE ESTERASE UR QL STRIP.AUTO: ABNORMAL
NITRITE UR QL STRIP.AUTO: NEGATIVE
PH UR STRIP: 6.5 [PH] (ref 5–8)
POTASSIUM BLD-SCNC: 3.4 MMOL/L (ref 3.5–5.1)
PROT UR STRIP-MCNC: NEGATIVE MG/DL
RBC #/AREA URNS HPF: ABNORMAL /HPF (ref 0–5)
SERVICE CMNT-IMP: ABNORMAL
SODIUM BLD-SCNC: 140 MMOL/L (ref 136–145)
SP GR UR REFRACTOMETRY: 1.01 (ref 1–1.03)
UA: UC IF INDICATED,UAUC: ABNORMAL
UROBILINOGEN UR QL STRIP.AUTO: 0.2 EU/DL (ref 0.2–1)
WBC URNS QL MICRO: ABNORMAL /HPF (ref 0–4)

## 2020-01-14 PROCEDURE — 99283 EMERGENCY DEPT VISIT LOW MDM: CPT

## 2020-01-14 PROCEDURE — 81001 URINALYSIS AUTO W/SCOPE: CPT

## 2020-01-14 PROCEDURE — 80047 BASIC METABLC PNL IONIZED CA: CPT

## 2020-01-14 PROCEDURE — 74011250637 HC RX REV CODE- 250/637: Performed by: EMERGENCY MEDICINE

## 2020-01-14 RX ORDER — ACETAMINOPHEN 500 MG
1000 TABLET ORAL ONCE
Status: COMPLETED | OUTPATIENT
Start: 2020-01-14 | End: 2020-01-14

## 2020-01-14 RX ORDER — NEOMYCIN SULFATE, POLYMYXIN B SULFATE AND HYDROCORTISONE 10; 3.5; 1 MG/ML; MG/ML; [USP'U]/ML
4 SUSPENSION/ DROPS AURICULAR (OTIC) 4 TIMES DAILY
Qty: 10 ML | Refills: 0 | Status: SHIPPED | OUTPATIENT
Start: 2020-01-14 | End: 2020-01-21

## 2020-01-14 RX ADMIN — ACETAMINOPHEN 1000 MG: 500 TABLET ORAL at 10:30

## 2020-01-14 NOTE — ED PROVIDER NOTES
EMERGENCY DEPARTMENT HISTORY AND PHYSICAL EXAM      Date: 1/14/2020  Patient Name: Austin Falk    History of Presenting Illness     Chief Complaint   Patient presents with    Flank Pain     Patient reports L flank pain; denies any difficulty urinating    Ear Pain     Patient reports ear drainage and clogged R ear       History Provided By: Patient    HPI: Austin Falk, 81 y.o. male with PMHx significant for hypertension, hyperlipidemia, who presents with a chief complaint of left lower back pain as well as right ear pain and drainage. Patient states that he has had pain in his lower back ongoing for months, however feels that it was slightly worse this morning. The pain is worse with movement, does not radiate down his leg, he has not tried taking anything for it at home. Patient states that previously he was on antibiotics that harm his kidneys, so with the location of his pain was concerned maybe something was wrong with his kidneys though he denies any difficulty with urination or hematuria. Additionally, patient notes some right ear pain and drainage for the last 2 weeks. States he is having difficulty hearing out of his right ear as a result. He has not seen his primary care provider regarding any of his symptoms. No chest pain, shortness of breath, abdominal pain, nausea, vomiting. PCP: Girogi Post MD    There are no other complaints, changes, or physical findings at this time. Current Outpatient Medications   Medication Sig Dispense Refill    neomycin-polymyxin-hydrocortisone, buffered, (PEDIOTIC) 3.5-10,000-1 mg/mL-unit/mL-% otic suspension Administer 4 Drops in left ear four (4) times daily for 7 days. 10 mL 0    aspirin delayed-release 81 mg tablet We are holding until hemoptysis get resolves, resume week after blood in sputum gets resolved 30 Tab 0    losartan (COZAAR) 100 mg tablet Take 100 mg by mouth daily.  ezetimibe (ZETIA) 10 mg tablet Take 10 mg by mouth daily.  amLODIPine (NORVASC) 2.5 mg tablet Take 2.5 mg by mouth daily.  folic acid/multivit-min/lutein (CENTRUM SILVER PO) Take 1 Tab by mouth daily.  hydroCHLOROthiazide (HYDRODIURIL) 25 mg tablet Take 25 mg by mouth daily.  metoprolol tartrate (LOPRESSOR) 50 mg tablet Take 50 mg by mouth two (2) times a day. Past History     Past Medical History:  Past Medical History:   Diagnosis Date    Arrhythmia     enlarged heart, murmur    Arthritis     HTN (hypertension)     Hypercholesterolemia     Low serum testosterone level     Sleep apnea     wears CPAP at night, does not wear regularly     Past Surgical History:  Past Surgical History:   Procedure Laterality Date    COLONOSCOPY N/A 2018    COLONOSCOPY performed by Arnav Rowell MD at [place] [address] N/A 2019    FLEXIBLE SIGMOIDOSCOPY (PARTIAL) performed by aVndana Elias MD at [facility] ENDOSCOPY    HX APPENDECTOMY      HX GI      COLONOSCOPY    HX HEENT      LASIK    HX HERNIA REPAIR      [facility]    HX ORTHOPAEDIC Right 2012    [place], [place].([facility]) ROTATOR CUFF    HX TONSILLECTOMY       Family History:  Family History   Problem Relation Age of Onset    Heart Disease Mother     Hypertension Mother     Heart Disease Father          \"Heart failure\" age 76    Hypertension Father     Bleeding Prob Sister         AFTER CHILDBIRTH-BLEEDING    Liver Disease Brother          cirrhosis (ETOH)    Alcohol abuse Brother     Heart Disease Sister         HEART FAILURE    Arthritis-osteo Sister      Social History:  Social History     Tobacco Use    Smoking status: Former Smoker     Packs/day: 0.50     Last attempt to quit:      Years since quittin.0    Smokeless tobacco: Never Used    Tobacco comment: CIGARS   Substance Use Topics    Alcohol use: No     Frequency: Never    Drug use: No     Allergies:   Allergies   Allergen Reactions    Ace Inhibitors Angioedema     Lip swelling with losartan    Shrimp Hives    Statins-Hmg-Coa Reductase Inhibitors Myalgia     Review of Systems   Review of Systems   Constitutional: Negative for chills and fever. HENT: Positive for ear discharge and ear pain. Negative for congestion, rhinorrhea and sore throat. Respiratory: Negative for cough and shortness of breath. Cardiovascular: Negative for chest pain. Gastrointestinal: Negative for abdominal pain, nausea and vomiting. Genitourinary: Negative for dysuria and urgency. Musculoskeletal: Positive for back pain. Skin: Negative for rash. Neurological: Negative for dizziness, light-headedness and headaches. All other systems reviewed and are negative. Physical Exam   Physical Exam  Vitals signs and nursing note reviewed. Constitutional:       General: He is not in acute distress. Appearance: He is well-developed. HENT:      Head: Normocephalic and atraumatic. Right Ear: Tympanic membrane normal.      Left Ear: Tympanic membrane normal.      Ears:      Comments: Significant swelling to the right ear canal with erythema and drainage  Eyes:      Conjunctiva/sclera: Conjunctivae normal.      Pupils: Pupils are equal, round, and reactive to light. Neck:      Musculoskeletal: Normal range of motion. Cardiovascular:      Rate and Rhythm: Normal rate and regular rhythm. Pulmonary:      Effort: Pulmonary effort is normal. No respiratory distress. Breath sounds: Normal breath sounds. No stridor. Abdominal:      General: There is no distension. Palpations: Abdomen is soft. Tenderness: There is no tenderness. Musculoskeletal: Normal range of motion. Lumbar back: He exhibits tenderness. He exhibits no bony tenderness. Back:    Skin:     General: Skin is warm and dry. Neurological:      Mental Status: He is alert and oriented to person, place, and time.        Diagnostic Study Results   Labs -     Recent Results (from the past 12 hour(s)) URINALYSIS W/ REFLEX CULTURE    Collection Time: 01/14/20  9:50 AM   Result Value Ref Range    Color YELLOW/STRAW      Appearance CLEAR CLEAR      Specific gravity 1.013 1.003 - 1.030      pH (UA) 6.5 5.0 - 8.0      Protein NEGATIVE  NEG mg/dL    Glucose NEGATIVE  NEG mg/dL    Ketone NEGATIVE  NEG mg/dL    Bilirubin NEGATIVE  NEG      Blood NEGATIVE  NEG      Urobilinogen 0.2 0.2 - 1.0 EU/dL    Nitrites NEGATIVE  NEG      Leukocyte Esterase TRACE (A) NEG      WBC 0-4 0 - 4 /hpf    RBC 0-5 0 - 5 /hpf    Epithelial cells FEW FEW /lpf    Bacteria NEGATIVE  NEG /hpf    UA:UC IF INDICATED CULTURE NOT INDICATED BY UA RESULT CNI      Hyaline cast 0-2 0 - 5 /lpf   POC CHEM8    Collection Time: 01/14/20 11:05 AM   Result Value Ref Range    Calcium, ionized (POC) 1.20 1. 12 - 1.32 mmol/L    Sodium (POC) 140 136 - 145 mmol/L    Potassium (POC) 3.4 (L) 3.5 - 5.1 mmol/L    Chloride (POC) 99 98 - 107 mmol/L    CO2 (POC) 31 21 - 32 mmol/L    Anion gap (POC) 14 10 - 20 mmol/L    Glucose (POC) 91 65 - 100 mg/dL    BUN (POC) 15 9 - 20 mg/dL    Creatinine (POC) 1.4 (H) 0.6 - 1.3 mg/dL    GFRAA, POC >60 >60 ml/min/1.73m2    GFRNA, POC 51 (L) >60 ml/min/1.73m2    Hematocrit (POC) 43 36.6 - 50.3 %    Comment Comment Not Indicated. Radiologic Studies -   No orders to display     No results found. Medical Decision Making   I am the first provider for this patient. I reviewed the vital signs, available nursing notes, past medical history, past surgical history, family history and social history. Vital Signs-Reviewed the patient's vital signs. Patient Vitals for the past 12 hrs:   Temp Pulse Resp BP SpO2   01/14/20 0941 98.3 °F (36.8 °C) 65 18 (!) 157/93 100 %       Pulse Oximetry Analysis - 100% on RA  . Records Reviewed: Nursing Notes and Old Medical Records    Provider Notes (Medical Decision Making):   Patient presents with low back pain and ear pain and drainage. Ear exam consistent with otitis externa.   Will start on eardrops. In terms of his back pain, suspect likely musculoskeletal back pain. Pain is worse with movement, no midline tenderness or neurologic deficits to warrant further imaging at this time. Given patient's concern about his kidneys, will check Chem-8 as well as a urinalysis. Have low suspicion that his symptoms are related to his kidneys. ED Course:   Initial assessment performed. The patients presenting problems have been discussed, and they are in agreement with the care plan formulated and outlined with them. I have encouraged them to ask questions as they arise throughout their visit. Critical Care:  none    Disposition:  Discharge Note:  11:26 AM  The patient has been re-evaluated and is ready for discharge. Reviewed available results with patient. Counseled patient on diagnosis and care plan. Patient has expressed understanding, and all questions have been answered. Patient agrees with plan and agrees to follow up as recommended, or to return to the ED if their symptoms worsen. Discharge instructions have been provided and explained to the patient, along with reasons to return to the ED. PLAN:  1. Discharge Medication List as of 1/14/2020 11:26 AM      START taking these medications    Details   neomycin-polymyxin-hydrocortisone, buffered, (PEDIOTIC) 3.5-10,000-1 mg/mL-unit/mL-% otic suspension Administer 4 Drops in left ear four (4) times daily for 7 days. , Normal, Disp-10 mL, R-0         CONTINUE these medications which have NOT CHANGED    Details   aspirin delayed-release 81 mg tablet We are holding until hemoptysis get resolves, resume week after blood in sputum gets resolved, No Print, Disp-30 Tab, R-0      losartan (COZAAR) 100 mg tablet Take 100 mg by mouth daily. , Historical Med      ezetimibe (ZETIA) 10 mg tablet Take 10 mg by mouth daily. , Historical Med      amLODIPine (NORVASC) 2.5 mg tablet Take 2.5 mg by mouth daily. , Historical Med      folic acid/multivit-min/lutein (CENTRUM SILVER PO) Take 1 Tab by mouth daily. , Historical Med      hydroCHLOROthiazide (HYDRODIURIL) 25 mg tablet Take 25 mg by mouth daily. , Historical Med      metoprolol tartrate (LOPRESSOR) 50 mg tablet Take 50 mg by mouth two (2) times a day., Historical Med           2. Follow-up Information     Follow up With Specialties Details Why Contact Info    Nika Berger MD Internal Medicine Schedule an appointment as soon as possible for a visit  [id number]  [address] [address] [phone]      [facility] EMERGENCY DEPT Emergency Medicine  As needed, If symptoms worsen [address]  [address] [facility]  [phone]        Return to ED if worse     Diagnosis     Clinical Impression:   1. Acute otitis externa of right ear, unspecified type    2. Left-sided low back pain without sciatica, unspecified chronicity        This note will not be viewable in [employer]. Please note that this dictation was completed with [employer], the computer voice recognition software. Quite often unanticipated grammatical, syntax, homophones, and other interpretive errors are inadvertently transcribed by the computer software. Please disregard these errors.   Please excuse any errors that have escaped final proofreading

## 2022-03-18 PROBLEM — R04.89 PULMONARY HEMORRHAGE: Status: ACTIVE | Noted: 2019-07-09

## 2022-03-19 PROBLEM — R04.2 HEMOPTYSIS: Status: ACTIVE | Noted: 2019-07-09

## 2022-03-19 PROBLEM — J18.9 PNEUMONIA: Status: ACTIVE | Noted: 2019-07-09

## 2024-09-18 ENCOUNTER — HOSPITAL ENCOUNTER (OUTPATIENT)
Facility: HOSPITAL | Age: 74
Discharge: HOME | End: 2024-09-18
Attending: INTERNAL MEDICINE | Admitting: INTERNAL MEDICINE
Payer: MEDICARE

## 2024-09-18 VITALS
RESPIRATION RATE: 17 BRPM | SYSTOLIC BLOOD PRESSURE: 121 MMHG | TEMPERATURE: 98.5 F | DIASTOLIC BLOOD PRESSURE: 58 MMHG | OXYGEN SATURATION: 96 % | HEIGHT: 71 IN | WEIGHT: 250 LBS | BODY MASS INDEX: 35 KG/M2 | HEART RATE: 70 BPM

## 2024-09-18 DIAGNOSIS — R94.39 ABNORMAL STRESS TEST: ICD-10-CM

## 2024-09-18 LAB — ECHO BSA: 2.38 M2

## 2024-09-18 PROCEDURE — 2500000003 HC RX 250 WO HCPCS: Performed by: INTERNAL MEDICINE

## 2024-09-18 PROCEDURE — 99152 MOD SED SAME PHYS/QHP 5/>YRS: CPT | Performed by: INTERNAL MEDICINE

## 2024-09-18 PROCEDURE — 93454 CORONARY ARTERY ANGIO S&I: CPT | Performed by: INTERNAL MEDICINE

## 2024-09-18 PROCEDURE — C1713 ANCHOR/SCREW BN/BN,TIS/BN: HCPCS | Performed by: INTERNAL MEDICINE

## 2024-09-18 PROCEDURE — C1894 INTRO/SHEATH, NON-LASER: HCPCS | Performed by: INTERNAL MEDICINE

## 2024-09-18 PROCEDURE — 6360000002 HC RX W HCPCS: Performed by: INTERNAL MEDICINE

## 2024-09-18 PROCEDURE — 99153 MOD SED SAME PHYS/QHP EA: CPT | Performed by: INTERNAL MEDICINE

## 2024-09-18 PROCEDURE — 6360000004 HC RX CONTRAST MEDICATION: Performed by: INTERNAL MEDICINE

## 2024-09-18 PROCEDURE — 2709999900 HC NON-CHARGEABLE SUPPLY: Performed by: INTERNAL MEDICINE

## 2024-09-18 PROCEDURE — C1769 GUIDE WIRE: HCPCS | Performed by: INTERNAL MEDICINE

## 2024-09-18 RX ORDER — IOPAMIDOL 755 MG/ML
INJECTION, SOLUTION INTRAVASCULAR PRN
Status: DISCONTINUED | OUTPATIENT
Start: 2024-09-18 | End: 2024-09-18 | Stop reason: HOSPADM

## 2024-09-18 RX ORDER — MULTIVITAMIN WITH IRON
1 TABLET ORAL DAILY
COMMUNITY

## 2024-09-18 RX ORDER — VERAPAMIL HYDROCHLORIDE 2.5 MG/ML
INJECTION, SOLUTION INTRAVENOUS PRN
Status: DISCONTINUED | OUTPATIENT
Start: 2024-09-18 | End: 2024-09-18 | Stop reason: HOSPADM

## 2024-09-18 RX ORDER — SODIUM CHLORIDE 0.9 % (FLUSH) 0.9 %
5-40 SYRINGE (ML) INJECTION EVERY 12 HOURS SCHEDULED
Status: DISCONTINUED | OUTPATIENT
Start: 2024-09-18 | End: 2024-09-18 | Stop reason: HOSPADM

## 2024-09-18 RX ORDER — FENTANYL CITRATE 50 UG/ML
INJECTION, SOLUTION INTRAMUSCULAR; INTRAVENOUS PRN
Status: DISCONTINUED | OUTPATIENT
Start: 2024-09-18 | End: 2024-09-18 | Stop reason: HOSPADM

## 2024-09-18 RX ORDER — LIDOCAINE HYDROCHLORIDE 10 MG/ML
INJECTION, SOLUTION INFILTRATION; PERINEURAL PRN
Status: DISCONTINUED | OUTPATIENT
Start: 2024-09-18 | End: 2024-09-18 | Stop reason: HOSPADM

## 2024-09-18 RX ORDER — BEMPEDOIC ACID 180 MG/1
180 TABLET, FILM COATED ORAL DAILY
COMMUNITY

## 2024-09-18 RX ORDER — SODIUM CHLORIDE 0.9 % (FLUSH) 0.9 %
5-40 SYRINGE (ML) INJECTION PRN
Status: DISCONTINUED | OUTPATIENT
Start: 2024-09-18 | End: 2024-09-18 | Stop reason: HOSPADM

## 2024-09-18 RX ORDER — ACETAMINOPHEN 325 MG/1
650 TABLET ORAL EVERY 4 HOURS PRN
Status: DISCONTINUED | OUTPATIENT
Start: 2024-09-18 | End: 2024-09-18 | Stop reason: HOSPADM

## 2024-09-18 RX ORDER — ASPIRIN 81 MG/1
81 TABLET ORAL DAILY
COMMUNITY

## 2024-09-18 RX ORDER — LOSARTAN POTASSIUM 100 MG/1
100 TABLET ORAL DAILY
COMMUNITY

## 2024-09-18 RX ORDER — METOPROLOL TARTRATE 25 MG/1
25 TABLET, FILM COATED ORAL 2 TIMES DAILY
COMMUNITY

## 2024-09-18 RX ORDER — FUROSEMIDE 40 MG
40 TABLET ORAL DAILY
COMMUNITY

## 2024-09-18 RX ORDER — ISOSORBIDE MONONITRATE 30 MG/1
30 TABLET, EXTENDED RELEASE ORAL DAILY
Qty: 30 TABLET | Refills: 5 | Status: SHIPPED | OUTPATIENT
Start: 2024-09-19

## 2024-09-18 RX ORDER — ISOSORBIDE MONONITRATE 30 MG/1
30 TABLET, EXTENDED RELEASE ORAL DAILY
Status: DISCONTINUED | OUTPATIENT
Start: 2024-09-19 | End: 2024-09-18 | Stop reason: HOSPADM

## 2024-09-18 RX ORDER — MIDAZOLAM HYDROCHLORIDE 1 MG/ML
INJECTION INTRAMUSCULAR; INTRAVENOUS PRN
Status: DISCONTINUED | OUTPATIENT
Start: 2024-09-18 | End: 2024-09-18 | Stop reason: HOSPADM

## 2024-09-18 RX ORDER — HEPARIN SODIUM 1000 [USP'U]/ML
INJECTION, SOLUTION INTRAVENOUS; SUBCUTANEOUS PRN
Status: DISCONTINUED | OUTPATIENT
Start: 2024-09-18 | End: 2024-09-18 | Stop reason: HOSPADM

## 2024-09-18 RX ORDER — EZETIMIBE 10 MG/1
10 TABLET ORAL DAILY
COMMUNITY

## 2024-09-18 RX ORDER — FINASTERIDE 5 MG/1
5 TABLET, FILM COATED ORAL DAILY
COMMUNITY

## 2024-09-18 RX ORDER — SODIUM CHLORIDE 9 MG/ML
INJECTION, SOLUTION INTRAVENOUS PRN
Status: DISCONTINUED | OUTPATIENT
Start: 2024-09-18 | End: 2024-09-18 | Stop reason: HOSPADM

## 2024-09-18 RX ORDER — TAMSULOSIN HYDROCHLORIDE 0.4 MG/1
0.4 CAPSULE ORAL DAILY
COMMUNITY

## 2024-09-18 RX ORDER — AMLODIPINE BESYLATE 10 MG/1
10 TABLET ORAL DAILY
COMMUNITY

## 2024-09-18 RX ORDER — PITAVASTATIN MAGNESIUM 2 MG/1
2 TABLET, FILM COATED ORAL DAILY
COMMUNITY

## (undated) DEVICE — CATH IV AUTOGRD BC PNK 20GA 25 -- INSYTE

## (undated) DEVICE — Device

## (undated) DEVICE — NEEDLE HYPO 18GA L1.5IN PNK S STL HUB POLYPR SHLD REG BVL

## (undated) DEVICE — CATHETER DIAG 5FR L100CM LUMN ID0.047IN JL3.5 CRV 0 SIDE H

## (undated) DEVICE — NEONATAL-ADULT SPO2 SENSOR: Brand: NELLCOR

## (undated) DEVICE — TOWEL 4 PLY TISS 19X30 SUE WHT

## (undated) DEVICE — SYR 10ML LUER LOK 1/5ML GRAD --

## (undated) DEVICE — KENDALL RADIOLUCENT FOAM MONITORING ELECTRODE RECTANGULAR SHAPE: Brand: KENDALL

## (undated) DEVICE — KIT MED IMAG CNTRST AGNT W/ IOPAMIDOL REUSE

## (undated) DEVICE — WASTE KIT - ST MARY: Brand: MEDLINE INDUSTRIES, INC.

## (undated) DEVICE — BASIN EMSIS 16OZ GRAPHITE PLAS KID SHP MOLD GRAD FOR ORAL

## (undated) DEVICE — PACK PROCEDURE SURG HRT CATH

## (undated) DEVICE — SET ADMIN 16ML TBNG L100IN 2 Y INJ SITE IV PIGGY BK DISP

## (undated) DEVICE — KIT HND CTRL 3 W STPCOCK ROT END 54IN PREM HI PRSS TBNG AT

## (undated) DEVICE — SYR 3ML LL TIP 1/10ML GRAD --

## (undated) DEVICE — SOLIDIFIER MEDC 1200ML -- CONVERT TO 356117

## (undated) DEVICE — 1200 GUARD II KIT W/5MM TUBE W/O VAC TUBE: Brand: GUARDIAN

## (undated) DEVICE — Z DISCONTINUED PER MEDLINE LINE GAS SAMPLING O2/CO2 LNG AD 13 FT NSL W/ TBNG FILTERLINE

## (undated) DEVICE — TR BAND RADIAL ARTERY COMPRESSION DEVICE: Brand: TR BAND

## (undated) DEVICE — KIT AT-X65 ANGIOTOUCH HAND CONTROLLER

## (undated) DEVICE — SPLINT WR VELC FOAM NEUT POS DISP FOR RAD ART ACC SFT STRP

## (undated) DEVICE — KIT MFLD ISOLATN NACL CNTRST PRT TBNG SPIK W/ PRSS TRNSDUC

## (undated) DEVICE — SPECIAL PROCEDURE DRAPE 32" X 34": Brand: SPECIAL PROCEDURE DRAPE

## (undated) DEVICE — CATHETER DIAG 5FR L100CM LUMN ID0.047IN JR4 CRV 0 SIDE H

## (undated) DEVICE — CUFF ADULT 1 PC 1 VINYL DISP --

## (undated) DEVICE — HI-TORQUE VERSACORE MODIFIED J GUIDE WIRE SYSTEM 145 CM: Brand: HI-TORQUE VERSACORE

## (undated) DEVICE — ANGIOGRAPHY KIT

## (undated) DEVICE — GLIDESHEATH SLENDER ACCESS KIT: Brand: GLIDESHEATH SLENDER